# Patient Record
Sex: MALE | Race: WHITE | URBAN - METROPOLITAN AREA
[De-identification: names, ages, dates, MRNs, and addresses within clinical notes are randomized per-mention and may not be internally consistent; named-entity substitution may affect disease eponyms.]

---

## 2021-05-12 ENCOUNTER — APPOINTMENT (OUTPATIENT)
Dept: CT IMAGING | Age: 46
End: 2021-05-12

## 2021-05-12 ENCOUNTER — ANESTHESIA (OUTPATIENT)
Dept: OPERATING ROOM | Age: 46
End: 2021-05-12

## 2021-05-12 ENCOUNTER — APPOINTMENT (OUTPATIENT)
Dept: ULTRASOUND IMAGING | Age: 46
End: 2021-05-12

## 2021-05-12 ENCOUNTER — ANESTHESIA EVENT (OUTPATIENT)
Dept: OPERATING ROOM | Age: 46
End: 2021-05-12

## 2021-05-12 ENCOUNTER — APPOINTMENT (OUTPATIENT)
Dept: GENERAL RADIOLOGY | Age: 46
End: 2021-05-12

## 2021-05-12 ENCOUNTER — HOSPITAL ENCOUNTER (OUTPATIENT)
Age: 46
Setting detail: OBSERVATION
Discharge: HOME | End: 2021-05-15
Attending: EMERGENCY MEDICINE | Admitting: SURGERY

## 2021-05-12 VITALS
DIASTOLIC BLOOD PRESSURE: 87 MMHG | SYSTOLIC BLOOD PRESSURE: 136 MMHG | TEMPERATURE: 98.4 F | RESPIRATION RATE: 7 BRPM | OXYGEN SATURATION: 95 %

## 2021-05-12 DIAGNOSIS — N32.89 INTRAPERITONEAL RUPTURE OF BLADDER: ICD-10-CM

## 2021-05-12 DIAGNOSIS — S01.01XA LACERATION OF SCALP, INITIAL ENCOUNTER: ICD-10-CM

## 2021-05-12 DIAGNOSIS — R10.84 GENERALIZED ABDOMINAL PAIN: Primary | ICD-10-CM

## 2021-05-12 PROBLEM — N17.9 AKI (ACUTE KIDNEY INJURY) (HCC): Status: ACTIVE | Noted: 2021-05-12

## 2021-05-12 PROBLEM — R31.9 URINARY TRACT INFECTION WITH HEMATURIA: Status: ACTIVE | Noted: 2021-05-12

## 2021-05-12 PROBLEM — R18.8 ASCITES: Status: ACTIVE | Noted: 2021-05-12

## 2021-05-12 PROBLEM — R31.9 HEMATURIA: Status: ACTIVE | Noted: 2021-05-12

## 2021-05-12 PROBLEM — R10.9 ABDOMINAL PAIN: Status: ACTIVE | Noted: 2021-05-12

## 2021-05-12 PROBLEM — R18.8 FREE FLUID IN PELVIS: Status: ACTIVE | Noted: 2021-05-12

## 2021-05-12 PROBLEM — N39.0 URINARY TRACT INFECTION WITH HEMATURIA: Status: ACTIVE | Noted: 2021-05-12

## 2021-05-12 LAB
ACETAMINOPHEN LEVEL: <5 MCG/ML (ref 10–30)
ADENOVIRUS BY PCR: NOT DETECTED
ALBUMIN SERPL-MCNC: 4.5 G/DL (ref 3.5–5.2)
ALP BLD-CCNC: 95 U/L (ref 40–129)
ALT SERPL-CCNC: 35 U/L (ref 0–40)
AMPHETAMINE SCREEN, URINE: NOT DETECTED
ANION GAP SERPL CALCULATED.3IONS-SCNC: 15 MMOL/L (ref 7–16)
AST SERPL-CCNC: 75 U/L (ref 0–39)
BACTERIA: ABNORMAL /HPF
BARBITURATE SCREEN URINE: NOT DETECTED
BASOPHILS ABSOLUTE: 0.01 E9/L (ref 0–0.2)
BASOPHILS RELATIVE PERCENT: 0.1 % (ref 0–2)
BENZODIAZEPINE SCREEN, URINE: NOT DETECTED
BILIRUB SERPL-MCNC: 0.3 MG/DL (ref 0–1.2)
BILIRUBIN URINE: ABNORMAL
BLOOD, URINE: ABNORMAL
BORDETELLA PARAPERTUSSIS BY PCR: NOT DETECTED
BORDETELLA PERTUSSIS BY PCR: NOT DETECTED
BUN BLDV-MCNC: 25 MG/DL (ref 6–20)
CALCIUM SERPL-MCNC: 8.8 MG/DL (ref 8.6–10.2)
CANNABINOID SCREEN URINE: NOT DETECTED
CHLAMYDOPHILIA PNEUMONIAE BY PCR: NOT DETECTED
CHLORIDE BLD-SCNC: 103 MMOL/L (ref 98–107)
CLARITY: ABNORMAL
CO2: 18 MMOL/L (ref 22–29)
COCAINE METABOLITE SCREEN URINE: NOT DETECTED
COLOR: ABNORMAL
CORONAVIRUS 229E BY PCR: NOT DETECTED
CORONAVIRUS HKU1 BY PCR: NOT DETECTED
CORONAVIRUS NL63 BY PCR: NOT DETECTED
CORONAVIRUS OC43 BY PCR: NOT DETECTED
CREAT SERPL-MCNC: 1.9 MG/DL (ref 0.7–1.2)
EKG ATRIAL RATE: 89 BPM
EKG P AXIS: 79 DEGREES
EKG P-R INTERVAL: 136 MS
EKG Q-T INTERVAL: 356 MS
EKG QRS DURATION: 84 MS
EKG QTC CALCULATION (BAZETT): 433 MS
EKG R AXIS: 70 DEGREES
EKG T AXIS: 43 DEGREES
EKG VENTRICULAR RATE: 89 BPM
EOSINOPHILS ABSOLUTE: 0 E9/L (ref 0.05–0.5)
EOSINOPHILS RELATIVE PERCENT: 0 % (ref 0–6)
EPITHELIAL CELLS, UA: ABNORMAL /HPF
ETHANOL: 77 MG/DL (ref 0–0.08)
FENTANYL SCREEN, URINE: NOT DETECTED
GFR AFRICAN AMERICAN: 47
GFR NON-AFRICAN AMERICAN: 39 ML/MIN/1.73
GLUCOSE BLD-MCNC: 117 MG/DL (ref 74–99)
GLUCOSE URINE: NEGATIVE MG/DL
HCT VFR BLD CALC: 40.1 % (ref 37–54)
HEMOGLOBIN: 13.6 G/DL (ref 12.5–16.5)
HUMAN METAPNEUMOVIRUS BY PCR: NOT DETECTED
HUMAN RHINOVIRUS/ENTEROVIRUS BY PCR: NOT DETECTED
IMMATURE GRANULOCYTES #: 0.03 E9/L
IMMATURE GRANULOCYTES %: 0.4 % (ref 0–5)
INFLUENZA A BY PCR: NOT DETECTED
INFLUENZA B BY PCR: NOT DETECTED
INR BLD: 1
KETONES, URINE: 15 MG/DL
LACTIC ACID: 2 MMOL/L (ref 0.5–2.2)
LACTIC ACID: 2.4 MMOL/L (ref 0.5–2.2)
LEUKOCYTE ESTERASE, URINE: ABNORMAL
LIPASE: 24 U/L (ref 13–60)
LYMPHOCYTES ABSOLUTE: 0.75 E9/L (ref 1.5–4)
LYMPHOCYTES RELATIVE PERCENT: 9.2 % (ref 20–42)
Lab: NORMAL
MCH RBC QN AUTO: 28.6 PG (ref 26–35)
MCHC RBC AUTO-ENTMCNC: 33.9 % (ref 32–34.5)
MCV RBC AUTO: 84.2 FL (ref 80–99.9)
METHADONE SCREEN, URINE: NOT DETECTED
MONOCYTES ABSOLUTE: 0.3 E9/L (ref 0.1–0.95)
MONOCYTES RELATIVE PERCENT: 3.7 % (ref 2–12)
MYCOPLASMA PNEUMONIAE BY PCR: NOT DETECTED
NEUTROPHILS ABSOLUTE: 7.05 E9/L (ref 1.8–7.3)
NEUTROPHILS RELATIVE PERCENT: 86.6 % (ref 43–80)
NITRITE, URINE: POSITIVE
OPIATE SCREEN URINE: NOT DETECTED
OXYCODONE URINE: NOT DETECTED
PARAINFLUENZA VIRUS 1 BY PCR: NOT DETECTED
PARAINFLUENZA VIRUS 2 BY PCR: NOT DETECTED
PARAINFLUENZA VIRUS 3 BY PCR: NOT DETECTED
PARAINFLUENZA VIRUS 4 BY PCR: NOT DETECTED
PDW BLD-RTO: 13.7 FL (ref 11.5–15)
PH UA: 7.5 (ref 5–9)
PHENCYCLIDINE SCREEN URINE: NOT DETECTED
PLATELET # BLD: 327 E9/L (ref 130–450)
PMV BLD AUTO: 8.8 FL (ref 7–12)
POTASSIUM SERPL-SCNC: 4.1 MMOL/L (ref 3.5–5)
PROCALCITONIN: 0.55 NG/ML (ref 0–0.08)
PROTEIN UA: >=300 MG/DL
PROTHROMBIN TIME: 11.8 SEC (ref 9.3–12.4)
RBC # BLD: 4.76 E12/L (ref 3.8–5.8)
RBC UA: >20 /HPF (ref 0–2)
RESPIRATORY SYNCYTIAL VIRUS BY PCR: NOT DETECTED
SALICYLATE, SERUM: <0.3 MG/DL (ref 0–30)
SARS-COV-2, NAAT: NOT DETECTED
SARS-COV-2, PCR: NOT DETECTED
SEDIMENTATION RATE, ERYTHROCYTE: 15 MM/HR (ref 0–15)
SODIUM BLD-SCNC: 136 MMOL/L (ref 132–146)
SPECIFIC GRAVITY UA: 1.02 (ref 1–1.03)
TOTAL PROTEIN: 7.4 G/DL (ref 6.4–8.3)
TRICYCLIC ANTIDEPRESSANTS SCREEN SERUM: NEGATIVE NG/ML
UROBILINOGEN, URINE: 2 E.U./DL
WBC # BLD: 8.1 E9/L (ref 4.5–11.5)
WBC UA: ABNORMAL /HPF (ref 0–5)

## 2021-05-12 PROCEDURE — 73110 X-RAY EXAM OF WRIST: CPT

## 2021-05-12 PROCEDURE — 82077 ASSAY SPEC XCP UR&BREATH IA: CPT

## 2021-05-12 PROCEDURE — 49320 DIAG LAPARO SEPARATE PROC: CPT | Performed by: SURGERY

## 2021-05-12 PROCEDURE — 6360000002 HC RX W HCPCS

## 2021-05-12 PROCEDURE — 80143 DRUG ASSAY ACETAMINOPHEN: CPT

## 2021-05-12 PROCEDURE — 51702 INSERT TEMP BLADDER CATH: CPT

## 2021-05-12 PROCEDURE — 85025 COMPLETE CBC W/AUTO DIFF WBC: CPT

## 2021-05-12 PROCEDURE — 99243 OFF/OP CNSLTJ NEW/EST LOW 30: CPT | Performed by: INTERNAL MEDICINE

## 2021-05-12 PROCEDURE — 87088 URINE BACTERIA CULTURE: CPT

## 2021-05-12 PROCEDURE — 93005 ELECTROCARDIOGRAM TRACING: CPT | Performed by: EMERGENCY MEDICINE

## 2021-05-12 PROCEDURE — 6360000002 HC RX W HCPCS: Performed by: SURGERY

## 2021-05-12 PROCEDURE — G0378 HOSPITAL OBSERVATION PER HR: HCPCS

## 2021-05-12 PROCEDURE — 7100000000 HC PACU RECOVERY - FIRST 15 MIN: Performed by: UROLOGY

## 2021-05-12 PROCEDURE — 3700000001 HC ADD 15 MINUTES (ANESTHESIA): Performed by: UROLOGY

## 2021-05-12 PROCEDURE — 51798 US URINE CAPACITY MEASURE: CPT

## 2021-05-12 PROCEDURE — 2500000003 HC RX 250 WO HCPCS: Performed by: UROLOGY

## 2021-05-12 PROCEDURE — 3600000019 HC SURGERY ROBOT ADDTL 15MIN: Performed by: UROLOGY

## 2021-05-12 PROCEDURE — 80053 COMPREHEN METABOLIC PANEL: CPT

## 2021-05-12 PROCEDURE — 87635 SARS-COV-2 COVID-19 AMP PRB: CPT

## 2021-05-12 PROCEDURE — 83690 ASSAY OF LIPASE: CPT

## 2021-05-12 PROCEDURE — 7100000001 HC PACU RECOVERY - ADDTL 15 MIN: Performed by: UROLOGY

## 2021-05-12 PROCEDURE — 6360000004 HC RX CONTRAST MEDICATION: Performed by: RADIOLOGY

## 2021-05-12 PROCEDURE — 80307 DRUG TEST PRSMV CHEM ANLYZR: CPT

## 2021-05-12 PROCEDURE — 2580000003 HC RX 258: Performed by: EMERGENCY MEDICINE

## 2021-05-12 PROCEDURE — 6360000002 HC RX W HCPCS: Performed by: NURSE ANESTHETIST, CERTIFIED REGISTERED

## 2021-05-12 PROCEDURE — 2709999900 HC NON-CHARGEABLE SUPPLY: Performed by: UROLOGY

## 2021-05-12 PROCEDURE — 80179 DRUG ASSAY SALICYLATE: CPT

## 2021-05-12 PROCEDURE — 96361 HYDRATE IV INFUSION ADD-ON: CPT

## 2021-05-12 PROCEDURE — 36415 COLL VENOUS BLD VENIPUNCTURE: CPT

## 2021-05-12 PROCEDURE — 84145 PROCALCITONIN (PCT): CPT

## 2021-05-12 PROCEDURE — 99219 PR INITIAL OBSERVATION CARE/DAY 50 MINUTES: CPT | Performed by: SURGERY

## 2021-05-12 PROCEDURE — 2500000003 HC RX 250 WO HCPCS: Performed by: NURSE ANESTHETIST, CERTIFIED REGISTERED

## 2021-05-12 PROCEDURE — 72125 CT NECK SPINE W/O DYE: CPT

## 2021-05-12 PROCEDURE — S2900 ROBOTIC SURGICAL SYSTEM: HCPCS | Performed by: UROLOGY

## 2021-05-12 PROCEDURE — 87040 BLOOD CULTURE FOR BACTERIA: CPT

## 2021-05-12 PROCEDURE — 99284 EMERGENCY DEPT VISIT MOD MDM: CPT

## 2021-05-12 PROCEDURE — 2580000003 HC RX 258: Performed by: SURGERY

## 2021-05-12 PROCEDURE — 96360 HYDRATION IV INFUSION INIT: CPT

## 2021-05-12 PROCEDURE — 87491 CHLMYD TRACH DNA AMP PROBE: CPT

## 2021-05-12 PROCEDURE — 87591 N.GONORRHOEAE DNA AMP PROB: CPT

## 2021-05-12 PROCEDURE — 0202U NFCT DS 22 TRGT SARS-COV-2: CPT

## 2021-05-12 PROCEDURE — 88112 CYTOPATH CELL ENHANCE TECH: CPT

## 2021-05-12 PROCEDURE — 81001 URINALYSIS AUTO W/SCOPE: CPT

## 2021-05-12 PROCEDURE — C9113 INJ PANTOPRAZOLE SODIUM, VIA: HCPCS | Performed by: SURGERY

## 2021-05-12 PROCEDURE — 83605 ASSAY OF LACTIC ACID: CPT

## 2021-05-12 PROCEDURE — 3700000000 HC ANESTHESIA ATTENDED CARE: Performed by: UROLOGY

## 2021-05-12 PROCEDURE — 6360000002 HC RX W HCPCS: Performed by: CLINICAL NURSE SPECIALIST

## 2021-05-12 PROCEDURE — 74176 CT ABD & PELVIS W/O CONTRAST: CPT

## 2021-05-12 PROCEDURE — 2500000003 HC RX 250 WO HCPCS: Performed by: SURGERY

## 2021-05-12 PROCEDURE — C1729 CATH, DRAINAGE: HCPCS | Performed by: UROLOGY

## 2021-05-12 PROCEDURE — 85610 PROTHROMBIN TIME: CPT

## 2021-05-12 PROCEDURE — 85651 RBC SED RATE NONAUTOMATED: CPT

## 2021-05-12 PROCEDURE — 72193 CT PELVIS W/DYE: CPT

## 2021-05-12 PROCEDURE — 76705 ECHO EXAM OF ABDOMEN: CPT

## 2021-05-12 PROCEDURE — 70450 CT HEAD/BRAIN W/O DYE: CPT

## 2021-05-12 PROCEDURE — 3600000009 HC SURGERY ROBOT BASE: Performed by: UROLOGY

## 2021-05-12 RX ORDER — NEOSTIGMINE METHYLSULFATE 1 MG/ML
INJECTION, SOLUTION INTRAVENOUS PRN
Status: DISCONTINUED | OUTPATIENT
Start: 2021-05-12 | End: 2021-05-12 | Stop reason: SDUPTHER

## 2021-05-12 RX ORDER — ONDANSETRON 2 MG/ML
4 INJECTION INTRAMUSCULAR; INTRAVENOUS EVERY 6 HOURS PRN
Status: DISCONTINUED | OUTPATIENT
Start: 2021-05-12 | End: 2021-05-15 | Stop reason: HOSPADM

## 2021-05-12 RX ORDER — PANTOPRAZOLE SODIUM 40 MG/10ML
40 INJECTION, POWDER, LYOPHILIZED, FOR SOLUTION INTRAVENOUS DAILY
Status: DISCONTINUED | OUTPATIENT
Start: 2021-05-12 | End: 2021-05-15 | Stop reason: HOSPADM

## 2021-05-12 RX ORDER — MORPHINE SULFATE 2 MG/ML
2 INJECTION, SOLUTION INTRAMUSCULAR; INTRAVENOUS EVERY 4 HOURS PRN
Status: DISCONTINUED | OUTPATIENT
Start: 2021-05-12 | End: 2021-05-14

## 2021-05-12 RX ORDER — CEFTRIAXONE 2 G/50ML
2000 INJECTION, SOLUTION INTRAVENOUS EVERY 24 HOURS
Status: DISCONTINUED | OUTPATIENT
Start: 2021-05-12 | End: 2021-05-14

## 2021-05-12 RX ORDER — SODIUM CHLORIDE 9 MG/ML
25 INJECTION, SOLUTION INTRAVENOUS PRN
Status: DISCONTINUED | OUTPATIENT
Start: 2021-05-12 | End: 2021-05-15 | Stop reason: HOSPADM

## 2021-05-12 RX ORDER — ROCURONIUM BROMIDE 10 MG/ML
INJECTION, SOLUTION INTRAVENOUS PRN
Status: DISCONTINUED | OUTPATIENT
Start: 2021-05-12 | End: 2021-05-12 | Stop reason: SDUPTHER

## 2021-05-12 RX ORDER — ONDANSETRON 4 MG/1
4 TABLET, ORALLY DISINTEGRATING ORAL EVERY 8 HOURS PRN
Status: DISCONTINUED | OUTPATIENT
Start: 2021-05-12 | End: 2021-05-15 | Stop reason: HOSPADM

## 2021-05-12 RX ORDER — MORPHINE SULFATE 4 MG/ML
4 INJECTION, SOLUTION INTRAMUSCULAR; INTRAVENOUS EVERY 4 HOURS PRN
Status: DISCONTINUED | OUTPATIENT
Start: 2021-05-12 | End: 2021-05-14

## 2021-05-12 RX ORDER — DEXAMETHASONE SODIUM PHOSPHATE 10 MG/ML
INJECTION, SOLUTION INTRAMUSCULAR; INTRAVENOUS PRN
Status: DISCONTINUED | OUTPATIENT
Start: 2021-05-12 | End: 2021-05-12 | Stop reason: SDUPTHER

## 2021-05-12 RX ORDER — OXYCODONE HYDROCHLORIDE AND ACETAMINOPHEN 5; 325 MG/1; MG/1
1 TABLET ORAL EVERY 6 HOURS PRN
Status: DISCONTINUED | OUTPATIENT
Start: 2021-05-12 | End: 2021-05-15 | Stop reason: HOSPADM

## 2021-05-12 RX ORDER — SUCCINYLCHOLINE/SOD CL,ISO/PF 200MG/10ML
SYRINGE (ML) INTRAVENOUS PRN
Status: DISCONTINUED | OUTPATIENT
Start: 2021-05-12 | End: 2021-05-12 | Stop reason: SDUPTHER

## 2021-05-12 RX ORDER — ONDANSETRON 2 MG/ML
INJECTION INTRAMUSCULAR; INTRAVENOUS PRN
Status: DISCONTINUED | OUTPATIENT
Start: 2021-05-12 | End: 2021-05-12 | Stop reason: SDUPTHER

## 2021-05-12 RX ORDER — 0.9 % SODIUM CHLORIDE 0.9 %
1000 INTRAVENOUS SOLUTION INTRAVENOUS ONCE
Status: COMPLETED | OUTPATIENT
Start: 2021-05-12 | End: 2021-05-12

## 2021-05-12 RX ORDER — BUPIVACAINE HYDROCHLORIDE 2.5 MG/ML
INJECTION, SOLUTION EPIDURAL; INFILTRATION; INTRACAUDAL PRN
Status: DISCONTINUED | OUTPATIENT
Start: 2021-05-12 | End: 2021-05-12 | Stop reason: ALTCHOICE

## 2021-05-12 RX ORDER — ATROPA BELLADONNA AND OPIUM 16.2; 6 MG/1; MG/1
60 SUPPOSITORY RECTAL EVERY 8 HOURS PRN
Status: DISCONTINUED | OUTPATIENT
Start: 2021-05-12 | End: 2021-05-15 | Stop reason: HOSPADM

## 2021-05-12 RX ORDER — GLYCOPYRROLATE 1 MG/5 ML
SYRINGE (ML) INTRAVENOUS PRN
Status: DISCONTINUED | OUTPATIENT
Start: 2021-05-12 | End: 2021-05-12 | Stop reason: SDUPTHER

## 2021-05-12 RX ORDER — PROPOFOL 10 MG/ML
INJECTION, EMULSION INTRAVENOUS PRN
Status: DISCONTINUED | OUTPATIENT
Start: 2021-05-12 | End: 2021-05-12 | Stop reason: SDUPTHER

## 2021-05-12 RX ORDER — FENTANYL CITRATE 50 UG/ML
25 INJECTION, SOLUTION INTRAMUSCULAR; INTRAVENOUS EVERY 5 MIN PRN
Status: DISCONTINUED | OUTPATIENT
Start: 2021-05-12 | End: 2021-05-12 | Stop reason: HOSPADM

## 2021-05-12 RX ORDER — SODIUM CHLORIDE, SODIUM LACTATE, POTASSIUM CHLORIDE, CALCIUM CHLORIDE 600; 310; 30; 20 MG/100ML; MG/100ML; MG/100ML; MG/100ML
INJECTION, SOLUTION INTRAVENOUS CONTINUOUS
Status: DISCONTINUED | OUTPATIENT
Start: 2021-05-12 | End: 2021-05-15 | Stop reason: HOSPADM

## 2021-05-12 RX ORDER — HEPARIN SODIUM 5000 [USP'U]/ML
5000 INJECTION, SOLUTION INTRAVENOUS; SUBCUTANEOUS EVERY 8 HOURS SCHEDULED
Status: DISCONTINUED | OUTPATIENT
Start: 2021-05-12 | End: 2021-05-15 | Stop reason: HOSPADM

## 2021-05-12 RX ORDER — FENTANYL CITRATE 50 UG/ML
INJECTION, SOLUTION INTRAMUSCULAR; INTRAVENOUS PRN
Status: DISCONTINUED | OUTPATIENT
Start: 2021-05-12 | End: 2021-05-12 | Stop reason: SDUPTHER

## 2021-05-12 RX ORDER — LIDOCAINE HYDROCHLORIDE 20 MG/ML
INJECTION, SOLUTION INTRAVENOUS PRN
Status: DISCONTINUED | OUTPATIENT
Start: 2021-05-12 | End: 2021-05-12 | Stop reason: SDUPTHER

## 2021-05-12 RX ORDER — SODIUM CHLORIDE 0.9 % (FLUSH) 0.9 %
10 SYRINGE (ML) INJECTION PRN
Status: DISCONTINUED | OUTPATIENT
Start: 2021-05-12 | End: 2021-05-15 | Stop reason: HOSPADM

## 2021-05-12 RX ORDER — SODIUM CHLORIDE 9 MG/ML
INJECTION, SOLUTION INTRAVENOUS CONTINUOUS
Status: DISCONTINUED | OUTPATIENT
Start: 2021-05-12 | End: 2021-05-12

## 2021-05-12 RX ORDER — SODIUM CHLORIDE 0.9 % (FLUSH) 0.9 %
10 SYRINGE (ML) INJECTION EVERY 12 HOURS SCHEDULED
Status: DISCONTINUED | OUTPATIENT
Start: 2021-05-12 | End: 2021-05-15 | Stop reason: HOSPADM

## 2021-05-12 RX ORDER — SODIUM CHLORIDE 9 MG/ML
10 INJECTION INTRAVENOUS DAILY
Status: DISCONTINUED | OUTPATIENT
Start: 2021-05-12 | End: 2021-05-15 | Stop reason: HOSPADM

## 2021-05-12 RX ADMIN — ROCURONIUM BROMIDE 5 MG: 10 SOLUTION INTRAVENOUS at 21:41

## 2021-05-12 RX ADMIN — MORPHINE SULFATE 4 MG: 4 INJECTION, SOLUTION INTRAMUSCULAR; INTRAVENOUS at 23:59

## 2021-05-12 RX ADMIN — METRONIDAZOLE 500 MG: 500 INJECTION, SOLUTION INTRAVENOUS at 19:40

## 2021-05-12 RX ADMIN — PROPOFOL 200 MG: 10 INJECTION, EMULSION INTRAVENOUS at 21:41

## 2021-05-12 RX ADMIN — HEPARIN SODIUM 5000 UNITS: 5000 INJECTION, SOLUTION INTRAVENOUS; SUBCUTANEOUS at 19:45

## 2021-05-12 RX ADMIN — SODIUM CHLORIDE 1000 ML: 9 INJECTION, SOLUTION INTRAVENOUS at 05:20

## 2021-05-12 RX ADMIN — MORPHINE SULFATE 4 MG: 4 INJECTION, SOLUTION INTRAMUSCULAR; INTRAVENOUS at 17:23

## 2021-05-12 RX ADMIN — SODIUM CHLORIDE, POTASSIUM CHLORIDE, SODIUM LACTATE AND CALCIUM CHLORIDE: 600; 310; 30; 20 INJECTION, SOLUTION INTRAVENOUS at 21:38

## 2021-05-12 RX ADMIN — ROCURONIUM BROMIDE 35 MG: 10 SOLUTION INTRAVENOUS at 21:48

## 2021-05-12 RX ADMIN — LIDOCAINE HYDROCHLORIDE 100 MG: 20 INJECTION, SOLUTION INTRAVENOUS at 21:41

## 2021-05-12 RX ADMIN — METRONIDAZOLE 500 MG: 500 INJECTION, SOLUTION INTRAVENOUS at 11:46

## 2021-05-12 RX ADMIN — SODIUM CHLORIDE, POTASSIUM CHLORIDE, SODIUM LACTATE AND CALCIUM CHLORIDE: 600; 310; 30; 20 INJECTION, SOLUTION INTRAVENOUS at 11:45

## 2021-05-12 RX ADMIN — FENTANYL CITRATE 150 MCG: 50 INJECTION, SOLUTION INTRAMUSCULAR; INTRAVENOUS at 21:41

## 2021-05-12 RX ADMIN — ONDANSETRON 4 MG: 2 INJECTION INTRAMUSCULAR; INTRAVENOUS at 22:28

## 2021-05-12 RX ADMIN — HYDROMORPHONE HYDROCHLORIDE 0.5 MG: 1 INJECTION, SOLUTION INTRAMUSCULAR; INTRAVENOUS; SUBCUTANEOUS at 23:06

## 2021-05-12 RX ADMIN — IOPAMIDOL 40 ML: 755 INJECTION, SOLUTION INTRAVENOUS at 18:05

## 2021-05-12 RX ADMIN — SODIUM CHLORIDE, POTASSIUM CHLORIDE, SODIUM LACTATE AND CALCIUM CHLORIDE: 600; 310; 30; 20 INJECTION, SOLUTION INTRAVENOUS at 21:52

## 2021-05-12 RX ADMIN — MORPHINE SULFATE 4 MG: 4 INJECTION, SOLUTION INTRAMUSCULAR; INTRAVENOUS at 11:56

## 2021-05-12 RX ADMIN — DEXAMETHASONE SODIUM PHOSPHATE 10 MG: 10 INJECTION, SOLUTION INTRAMUSCULAR; INTRAVENOUS at 21:41

## 2021-05-12 RX ADMIN — CEFTRIAXONE 2000 MG: 2 INJECTION, SOLUTION INTRAVENOUS at 13:53

## 2021-05-12 RX ADMIN — PANTOPRAZOLE SODIUM 40 MG: 40 INJECTION, POWDER, FOR SOLUTION INTRAVENOUS at 11:46

## 2021-05-12 RX ADMIN — Medication 3 MG: at 22:28

## 2021-05-12 RX ADMIN — Medication 0.6 MG: at 22:28

## 2021-05-12 RX ADMIN — Medication 140 MG: at 21:41

## 2021-05-12 RX ADMIN — SODIUM CHLORIDE 1000 ML: 9 INJECTION, SOLUTION INTRAVENOUS at 04:41

## 2021-05-12 RX ADMIN — SODIUM CHLORIDE, POTASSIUM CHLORIDE, SODIUM LACTATE AND CALCIUM CHLORIDE: 600; 310; 30; 20 INJECTION, SOLUTION INTRAVENOUS at 22:28

## 2021-05-12 RX ADMIN — Medication 0.5 MG: at 23:06

## 2021-05-12 RX ADMIN — SODIUM CHLORIDE, PRESERVATIVE FREE 10 ML: 5 INJECTION INTRAVENOUS at 11:46

## 2021-05-12 ASSESSMENT — PULMONARY FUNCTION TESTS
PIF_VALUE: 0
PIF_VALUE: 27
PIF_VALUE: 25
PIF_VALUE: 28
PIF_VALUE: 29
PIF_VALUE: 17
PIF_VALUE: 27
PIF_VALUE: 0
PIF_VALUE: 27
PIF_VALUE: 12
PIF_VALUE: 29
PIF_VALUE: 2
PIF_VALUE: 30
PIF_VALUE: 30
PIF_VALUE: 2
PIF_VALUE: 2
PIF_VALUE: 1
PIF_VALUE: 29
PIF_VALUE: 4
PIF_VALUE: 29

## 2021-05-12 ASSESSMENT — PAIN SCALES - GENERAL
PAINLEVEL_OUTOF10: 0
PAINLEVEL_OUTOF10: 2
PAINLEVEL_OUTOF10: 9

## 2021-05-12 ASSESSMENT — PAIN DESCRIPTION - DESCRIPTORS: DESCRIPTORS: PATIENT UNABLE TO DESCRIBE

## 2021-05-12 ASSESSMENT — LIFESTYLE VARIABLES: SMOKING_STATUS: 1

## 2021-05-12 NOTE — ED PROVIDER NOTES
Patient presents via EMS. 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 He is awake, alert but does not say anything other than \"infection\". A co-worker was reached via phone by RN. He states the patient fell and struck his head. They are unsure if alcohol was involved or the surrounding circumstances. He was not able to provide further history. The history is provided by a friend. Review of Systems   Unable to perform ROS: Other       Physical Exam  Vitals signs and nursing note reviewed. Constitutional:       General: He is not in acute distress. Appearance: Normal appearance. He is well-developed and normal weight. He is not ill-appearing or toxic-appearing. HENT:      Head: Normocephalic. Comments: Abrasion overlying hematoma to the posterior scalp       Nose: Nose normal.      Mouth/Throat:      Mouth: Mucous membranes are dry. Pharynx: Oropharynx is clear. Eyes:      Extraocular Movements: Extraocular movements intact. Conjunctiva/sclera: Conjunctivae normal.      Pupils: Pupils are equal, round, and reactive to light. Neck:      Musculoskeletal: Normal range of motion and neck supple. No neck rigidity or muscular tenderness. Cardiovascular:      Rate and Rhythm: Regular rhythm. Tachycardia present. Pulses: Normal pulses. Heart sounds: Normal heart sounds. No murmur. Pulmonary:      Effort: Pulmonary effort is normal. No respiratory distress. Breath sounds: Normal breath sounds. No wheezing or rales. Abdominal:      General: Bowel sounds are normal.      Palpations: Abdomen is soft. Tenderness: There is abdominal tenderness (diffuse). There is guarding. There is no right CVA tenderness, left CVA tenderness or rebound. Genitourinary:     Penis: Normal.       Testes: Normal.   Musculoskeletal: Normal range of motion. Right lower leg: No edema. Left lower leg: No edema. Skin:     General: Skin is warm and dry. Capillary Refill: Capillary refill takes less than 2 seconds. Coloration: Skin is not pale. Neurological:      General: No focal deficit present. Mental Status: He is alert. Mental status is at baseline. Motor: No weakness. Coordination: Coordination normal.         Procedures    MDM    ED Course as of May 12 0637   Wed May 12, 2021   0559 Re-examination of abdomen. He continues to have guarding and diffuse pain. CT read is concerning for free fluid. Call placed to surgery. [JS]      ED Course User Index  [JS] Brooke Lopez DO   Attempts are being made to use  iPad. Patient does not understand the . EKG Interpretation    Interpreted by emergency department physician    Rhythm: normal sinus   Rate: normal  Axis: normal  Ectopy: none  Conduction: normal  ST Segments: normal  T Waves: normal  Q Waves: none    Clinical Impression: no acute changes    Brooke Lopez  ED Course as of May 12 0637   Wed May 12, 2021   0559 Re-examination of abdomen. He continues to have guarding and diffuse pain. CT read is concerning for free fluid. Call placed to surgery. [JS]      ED Course User Index  [JS] Brooke Lopez DO       --------------------------------------------- PAST HISTORY ---------------------------------------------  Past Medical History:  has no past medical history on file. Past Surgical History:  has no past surgical history on file. Social History:      Family History: family history is not on file. The patients home medications have been reviewed.     Allergies: Patient has no allergy information on record.    -------------------------------------------------- RESULTS -------------------------------------------------    Lab  Results for orders placed or performed during the hospital encounter of 05/12/21   C.trachomatis N.gonorrhoeae DNA, Urine    Specimen: Urine   Result Value Ref Range    Source Urine abnormality. CT CERVICAL SPINE WO CONTRAST   Final Result   No acute abnormality of the cervical spine. MRI would be useful if symptoms   persist.         CT ABDOMEN PELVIS WO CONTRAST Additional Contrast? None   Final Result   Moderate amount of low-attenuation free fluid throughout the abdomen and   pelvis of uncertain etiology. Patchy areas of atelectasis versus developing infiltrates involving the right   greater than left lung base. Mild urinary bladder wall thickening. This may be related to   underdistention. Cystitis not excluded. Unusual curvilinear densities immediately superior to the urinary bladder. While these could represent volume averaging with small bowel loops, they are   difficult to definitively connect to bowel and therefore other etiologies   including ill-defined hemorrhage or hematoma not entirely excluded and should   be correlated with the patient's overall clinical presentation. Short-term   follow-up imaging may be useful if indicated. EKG:  This EKG is signed and interpreted by me.    ------------------------- NURSING NOTES AND VITALS REVIEWED ---------------------------  Date / Time Roomed:  5/12/2021  3:27 AM  ED Bed Assignment:  01/01    The nursing notes within the ED encounter and vital signs as below have been reviewed. Patient Vitals for the past 24 hrs:   BP Temp Temp src Pulse Resp SpO2 Height Weight   05/12/21 0612 (!) 143/79   91 18 95 %     05/12/21 0529 121/74   87 18 97 %     05/12/21 0424 110/62 98.2 °F (36.8 °C) Oral 88 18 99 % 5' 6\" (1.676 m) 150 lb (68 kg)   05/12/21 0311 122/66 98.3 °F (36.8 °C) Oral 100 18 99 %         Oxygen Saturation Interpretation: Normal            --------------------------------- ADDITIONAL PROVIDER NOTES ---------------------------------  Consultations:  Spoke with Dr. Curtis Isaac,  They will admit this patient.     This patient's ED course included: a personal history and physicial examination, multiple bedside re-evaluations and IV medications    This patient has remained hemodynamically stable during their ED course. Please note that the withdrawal or failure to initiate urgent interventions for this patient would likely result in a life threatening deterioration or permanent disability. Accordingly this patient received 0 minutes of critical care time, excluding separately billable procedures. Clinical Impression  1. Generalized abdominal pain    2. Laceration of scalp, initial encounter          Disposition  Patient's disposition: Admit to med/surg floor  Patient's condition is stable.        [address], DO  05/12/21 0700

## 2021-05-12 NOTE — H&P
General Surgery History and Physical  [facility]    Patient's Name/Date of Birth: Rosales Petty / 1/1/1887    Date: May 12, 2021     Surgeon: Kandi Graham M.D.    PCP: No primary care provider on file. Chief Complaint: Abdominal pain, fall    HPI:   Rosales Petty is a 81 y.o. male who presents for evaluation of abdominal pain, for. Timing is constant, radiation to middle abdomen lower abdomen, alleviated by none and started few days ago and severity is 9/10. Limited communication secondary to the patient's nationality and language he speaks. Patient's undocumented immigrant from [place] and are translation line does not provide services in his dialect. Attempted Faroese however he still seems to only partially comprehend our discussion. States he has had some lower abdominal pain has been worsening over the last 2 days. He admits some fevers denies any diarrhea. He has been unable to void over the last 24 hours. Denies any shortness of breath or chest pain. He does drink alcohol but he states this is not a daily occurrence but at this point our conversation becomes very ineffective. He denies any sick contacts. Denies any previous abdominal surgeries. Report from patient's coworker states that he fell and struck his head although there is no signs of trauma. Patient Active Problem List   Diagnosis    Abdominal pain       History reviewed. No pertinent past medical history. History reviewed. No pertinent surgical history. Not on File    The patient has a family history that is negative for severe cardiovascular or respiratory issues, negative for reaction to anesthesia. Time spent reviewing past medical, surgical, social and family history, vitals, nursing assessment and images. No changes from above documented history.     Social History     Socioeconomic History    Marital status: Unknown     Spouse name: Not on file    Number of children: Not on file    Years of education: Not on file    Highest education level: Not on file   Occupational History    Not on file   Social Needs    Financial resource strain: Not on file    Food insecurity     Worry: Not on file     Inability: Not on file    Transportation needs     Medical: Not on file     Non-medical: Not on file   Tobacco Use    Smoking status: Not on file   Substance and Sexual Activity    Alcohol use: Not on file    Drug use: Not on file    Sexual activity: Not on file   Lifestyle    Physical activity     Days per week: Not on file     Minutes per session: Not on file    Stress: Not on file   Relationships    Social connections     Talks on phone: Not on file     Gets together: Not on file     Attends Christianity service: Not on file     Active member of club or organization: Not on file     Attends meetings of clubs or organizations: Not on file     Relationship status: Not on file    Intimate partner violence     Fear of current or ex partner: Not on file     Emotionally abused: Not on file     Physically abused: Not on file     Forced sexual activity: Not on file   Other Topics Concern    Not on file   Social History Narrative    Not on file       I have reviewed relevant labs from this admission and interpretation is included in my assessment and plan    Review of Systems    A complete 10 system review was performed and are otherwise negative unless mentioned in the above HPI. Specific negatives are listed below but may not include all those reviewed.     General ROS: negative obtundation, AMS  ENT ROS: negative rhinorrhea, epistaxis  Allergy and Immunology ROS: negative itchy/watery eyes or nasal congestion  Hematological and Lymphatic ROS: negative spontaneous bleeding or bruising  Endocrine ROS: negative  lethargy, mood swings, palpitations or polydipsia/polyuria  Respiratory ROS: negative sputum changes, stridor, tachypnea or wheezing  Cardiovascular ROS: negative for - loss of consciousness, murmur or orthopnea  Gastrointestinal ROS: negative for - hematochezia or hematemesis  Genito-Urinary ROS: negative for -  genital discharge or hematuria  Musculoskeletal ROS: negative for - focal weakness, gangrene  Psych/Neuro ROS: negative for - visual or auditory hallucinations, suicidal ideation    Physical exam:   /83   Pulse 80   Temp 98.9 °F (37.2 °C) (Oral)   Resp 18   Ht 5' 6\" (1.676 m)   Wt 150 lb (68 kg)   SpO2 97%   BMI 24.21 kg/m²   General appearance:  NAD, appears stated age  Head: NCAT, PERRLA, EOMI, red conjunctiva  Neck: supple, no masses, trachea midline  Lungs: Equal chest rise bilateral, no retractions, no wheezing  Heart: Reg rate  Abdomen: soft, tender lower abdomen, distended  Skin; warm and dry, no cyanosis  Gu: no cva tenderness  Extremities: atraumatic, no focal motor deficits, no open wounds  Psych: No tremor, visual hallucinations      Radiology: I reviewed relevant abdominal imaging from this admission and that available in the EMR including CT abd/pel from admission. My assessment is free fluid in the within the abdomen with no evidence of bowel obstruction no evidence of free air or bowel perforation.   There is no evidence of solid organ fracture, there are some calcifications within the spleen but no obvious liver masses    Assessment:  Elly Grimes is a 81 y.o. male with abdominal pain, fall, limited communication  Patient Active Problem List   Diagnosis    Abdominal pain         Plan:  Admission  IV antibiotics  Consult hospitalist infectious disease  Discussed with infectious disease given the patient's symptoms will attempt aspiration of free fluid within the abdomen  Dave catheter will be placed and urine analysis sent  IV pain medication control  N.p.o. for now  Hydration May repeat CT with IV and oral contrast to better elucidate solid organ status  Currently no indication to proceed to the OR his hemoglobin appears stable      Jaquita Score Meg Recinos MD  11:59 AM  5/12/2021

## 2021-05-12 NOTE — ED NOTES
Bed: H2  Expected date: 5/12/21  Expected time:   Means of arrival: [place] [employer]  Comments:  Fall? ETOH?  Abdominal pain     Sharol Nissen, RN  05/12/21 0141

## 2021-05-12 NOTE — CONSULTS
mL, Intravenous, PRN, Melba Chandra MD    0.9 % sodium chloride infusion, 25 mL, Intravenous, PRN, Melba Chandra MD    metronidazole (FLAGYL) 500 mg in NaCl 100 mL IVPB premix, 500 mg, Intravenous, Q8H, Melba Chandra MD    cefTRIAXone (ROCEPHIN) 1,000 mg in sterile water 10 mL IV syringe, 1,000 mg, Intravenous, Daily, Melba Chandra MD    morphine (PF) injection 2 mg, 2 mg, Intravenous, Q4H PRN **OR** morphine injection 4 mg, 4 mg, Intravenous, Q4H PRN, Melba Chandra MD    pantoprazole (PROTONIX) injection 40 mg, 40 mg, Intravenous, Daily **AND** sodium chloride (PF) 0.9 % injection 10 mL, 10 mL, Intravenous, Daily, Melba Chandra MD    ondansetron (ZOFRAN-ODT) disintegrating tablet 4 mg, 4 mg, Oral, Q8H PRN **OR** ondansetron (ZOFRAN) injection 4 mg, 4 mg, Intravenous, Q6H PRN, Melba Chandra MD    heparin (porcine) injection 5,000 Units, 5,000 Units, Subcutaneous, 3 times per day, Melba Chandra MD, Stopped at 05/12/21 0915  ALLERGIES     Patient has no allergy information on record. There is no immunization history on file for this patient.   PAST MEDICAL HISTORY   Unknown And unable to obtain  SURGICAL HISTORY     Unknown and unable to obtin   FAMILY HISTORY     Unknown and unable to obtain   SOCIAL HISTORY       · From [address]       ED Triage Vitals   BP Temp Temp Source Pulse Resp SpO2 Height Weight   05/12/21 0311 05/12/21 0311 05/12/21 0311 05/12/21 0311 05/12/21 0311 05/12/21 0311 05/12/21 0424 05/12/21 0424   122/66 98.3 °F (36.8 °C) Oral 100 18 99 % 5' 6\" (1.676 m) 150 lb (68 kg)     Vitals:    Vitals:    05/12/21 0424 05/12/21 0529 05/12/21 0612 05/12/21 0737   BP: 110/62 121/74 (!) 143/79 137/83   Pulse: 88 87 91 80   Resp: 18 18 18 18   Temp: 98.2 °F (36.8 °C)   98.9 °F (37.2 °C)   TempSrc: Oral   Oral   SpO2: 99% 97% 95% 97%   Weight: 150 lb (68 kg)      Height: 5' 6\" (1.676 m)        Physical Exam   Constitutional/General: Alert and  In bed- + abdominal pain   Head: NC/AT  Eyes: PERRL, EOMI  Mouth: Normal mucosa, no thrush   Neck: Supple, full ROM,    Pulmonary: Lungs clear to auscultation bilaterally. Not in respiratory distress  Cardiovascular:  Regular rate and rhythm, no murmurs, gallops, or rubs. Abdomen: Soft, + BS.  some distention, ++ abdominal pain . Extremities: Moves all extremities x 4. Warm and well perfused  Pulses:  Distal pulses intact  Skin: Warm and dry without rash  PIV  Dave- hematuria      DIAGNOSTIC RESULTS   RADIOLOGY:   Ct Abdomen Pelvis Wo Contrast Additional Contrast? None    Result Date: 5/12/2021  EXAMINATION: CT OF THE ABDOMEN AND PELVIS WITHOUT CONTRAST 5/12/2021 5:13 am TECHNIQUE: CT of the abdomen and pelvis was performed without the administration of intravenous contrast. Multiplanar reformatted images are provided for review. Dose modulation, iterative reconstruction, and/or weight based adjustment of the mA/kV was utilized to reduce the radiation dose to as low as reasonably achievable. COMPARISON: None. HISTORY: ORDERING SYSTEM PROVIDED HISTORY: abdominal and pelvic pain TECHNOLOGIST PROVIDED HISTORY: Reason for exam:->abdominal and pelvic pain Additional Contrast?->None FINDINGS: Exam is limited without intravenous contrast.  There is some streak artifact in the upper abdomen due to positioning of the patient's arms. Liver is homogeneous. Gallbladder is unremarkable. Spleen is normal in size. Pancreas is unremarkable. No adrenal mass. No hydronephrosis or calcified renal stone. Assessment of bowel is limited without oral contrast.  There is no obvious evidence of bowel obstruction. The appendix is not identified with complete certainty. Moderate amount of free fluid is identified throughout the abdomen and the pelvis. This fluid measures approximately 6 Hounsfield units in the pelvis and between 0 and 3 Hounsfield units in areas in the abdomen. This is therefore thought to not represent hemoperitoneum. No abscess or free air identified. The urinary bladder is incompletely distended with some minimal wall thickening. Just superior to the bladder, there are some curvilinear areas of intermediate to increased density. These could represent volume averaging with loops of bowel. Other etiologies including some ill-defined hematoma/hemorrhage would be difficult to exclude but thought less likely. Patchy ill-defined opacities in the lung bases may represent atelectasis or developing infiltrates from pneumonia. L4 and L5 are partially fused. No acute fracture identified. Moderate amount of low-attenuation free fluid throughout the abdomen and pelvis of uncertain etiology. Patchy areas of atelectasis versus developing infiltrates involving the right greater than left lung base. Mild urinary bladder wall thickening. This may be related to underdistention. Cystitis not excluded. Unusual curvilinear densities immediately superior to the urinary bladder. While these could represent volume averaging with small bowel loops, they are difficult to definitively connect to bowel and therefore other etiologies including ill-defined hemorrhage or hematoma not entirely excluded and should be correlated with the patient's overall clinical presentation. Short-term follow-up imaging may be useful if indicated. Ct Head Wo Contrast    Result Date: 5/12/2021  EXAMINATION: CT OF THE HEAD WITHOUT CONTRAST  5/12/2021 5:13 am TECHNIQUE: CT of the head was performed without the administration of intravenous contrast. Dose modulation, iterative reconstruction, and/or weight based adjustment of the mA/kV was utilized to reduce the radiation dose to as low as reasonably achievable. COMPARISON: None. HISTORY: ORDERING SYSTEM PROVIDED HISTORY: fall, laceration of the scalp TECHNOLOGIST PROVIDED HISTORY: Reason for exam:->fall, laceration of the scalp Has a \"code stroke\" or \"stroke alert\" been called? ->No Decision Support Exception - unselect if not a suspected or confirmed emergency medical condition->Emergency Medical Condition (MA) FINDINGS: BRAIN/VENTRICLES: There is no acute intracranial hemorrhage, mass effect or midline shift. No abnormal extra-axial fluid collection. The gray-white differentiation is maintained without evidence of an acute infarct. There is no evidence of hydrocephalus. ORBITS: The visualized portion of the orbits demonstrate no acute abnormality. SINUSES: Minimal mucosal thickening scattered in the paranasal sinuses. SOFT TISSUES/SKULL:  No acute abnormality of the visualized skull or soft tissues. No acute intracranial abnormality. Ct Cervical Spine Wo Contrast    Result Date: 5/12/2021  EXAMINATION: CT OF THE CERVICAL SPINE WITHOUT CONTRAST 5/12/2021 5:13 am TECHNIQUE: CT of the cervical spine was performed without the administration of intravenous contrast. Multiplanar reformatted images are provided for review. Dose modulation, iterative reconstruction, and/or weight based adjustment of the mA/kV was utilized to reduce the radiation dose to as low as reasonably achievable. COMPARISON: None. HISTORY: ORDERING SYSTEM PROVIDED HISTORY: fall TECHNOLOGIST PROVIDED HISTORY: Reason for exam:->fall Decision Support Exception - unselect if not a suspected or confirmed emergency medical condition->Emergency Medical Condition (MA) FINDINGS: BONES/ALIGNMENT: There is no acute fracture or traumatic malalignment. DEGENERATIVE CHANGES: No significant degenerative changes. SOFT TISSUES: There is no prevertebral soft tissue swelling. No acute abnormality of the cervical spine.  MRI would be useful if symptoms persist.     LABS  Recent Labs     05/12/21  0402   WBC 8.1   HGB 13.6   HCT 40.1   MCV 84.2        Recent Labs     05/12/21  0402      K 4.1      CO2 18*   BUN 25*   CREATININE 1.9*   GFRAA 37   LABGLOM 31   GLUCOSE 117*   PROT 7.4   LABALBU 4.5   CALCIUM 8.8   BILITOT 0.3   ALKPHOS 95   AST 75*   ALT 35     Lab Results   Component Value Date    COVID19 Not Detected 05/12/2021     COVID-19/MIKE-COV2 LABS  Recent Labs     05/12/21  0402   AST 75*   ALT 35     MICROBIOLOGY:  Cultures :   Blood cultures- pending  Urine cx- pending     Patient is a 81 y.o. male who presented with   Chief Complaint   Patient presents with    Fall    Abdominal Pain      FINAL IMPRESSION      1. Generalized abdominal pain    2. Laceration of scalp, initial encounter    · Rule out abdominal process   · inability to urinate   · NICOLAS   · Hematuria         Plan:   · Currently on Rocephin- increase dose and flagyl- will continue for now   · Received Zosyn   · Attempting to place greenwood cath - may need urology consult   · Cultures pending   · CT reviewed   · For paracentesis   · Monitor labs   · Check HIV and hepatitis, procal , check IGG    Imaging and labs were reviewed per medical records and any ID pertinent labs were addressed with the patient. The patient/FAMILY  was educated about the diagnosis, prognosis, indications, risks and benefits of treatment. An opportunity to ask questions was given to the patient/FAMILY and questions were answered. Thank you for involving me in the care of [facility]. Please do not hesitate to call for any questions or concerns. Electronically signed by CASTRO Bhatti on 5/12/2021 at 11:04 AM        As above    This is a face to face encounter with [facility] on 05/12/21. I have performed and participated in the history, exam, medical decision making, and  POC with the NURSE PRACTITIONER. Imaging and labs were reviewed. [facility] was informed of their diagnosis, indications, risks and benefits of treatment. [facility] had the opportunity to ask questions. All questions were answered.   PTS SON CAME WITH ANOTHER   UROLOGY, HOSPITALIST AND NURSE  PRESENT  PT HAS BEEN HAVING HEMATURIA FOR AT LEAST A WEEK
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
## 2021-05-12 NOTE — ED TRIAGE NOTES
Patient arrived via EMS. Does not speak [place]. From [place]. [place] has several unique dialects, and  system unable to communicate with patient. See note from PEMA Leong RN, who communicated with coworker and provide patient's name, unsure of

## 2021-05-12 NOTE — CONSULTS
5/12/2021 3:16 PM  Service: Urology  Group: [facility] (Hood/Dawn/Kristy)    Bill Landers  [id number]     Chief Complaint:    Gross Hematuria, Urinary Retention     History of Present Illness: The patient is a 81 y.o. male patient who presented to the emergency department early this morning with complaints of abdominal pain and reportedly falling and striking his head. CT abdomen pelvis performed showed free fluid in the abdomen. General surgery was consulted and admitted. Upon arrival to the floor the patient was complaining of abdominal pain. A greenwood catheter was inserted and he had >2500ml of cherry colored urine output. There has been tremendous difficulty obtaining medical history and communicating with the patient due to language barrier. All of the medical history was obtained from the chart. He is apparently an undocumented immigrant from [place]. The  has been unable to determine language due to his unique dialect and there has been no success in obtaining a  specific to his dialect. His son and a friend showed up toward the end of the interview and stated that the patient has been having gross hematuria for approximately two days. He remains with some abdominal pain. Denies any previous history of surgeries. History reviewed. No pertinent past medical history. History reviewed. No pertinent surgical history. Medications Prior to Admission:    No medications prior to admission. Allergies:    Patient has no allergy information on record. Social History:        Family History:   Non-contributory to this Urological problem  family history is not on file. Review of Systems:  Unable to obtain due to language  Barrier     Physical Exam:     Vitals:  /83   Pulse 80   Temp 98.9 °F (37.2 °C) (Oral)   Resp 18   Ht 5' 6\" (1.676 m)   Wt 150 lb (68 kg)   SpO2 97%   BMI 24.21 kg/m²     General:  Awake and alert, no acute distress . HEENT:  Normocephalic, atraumatic. Lungs:  Respirations symmetric and non-labored. Abdomen:  soft, nontender, no masses  Extremities:  No clubbing, cyanosis, or edema  Skin:  Warm and dry, no open lesions or rashes  Neuro: There are no motor or sensory deficits in the 4 quadrant extremities   Rectal: deferred  Genitourinary:  Uncircumcised male, hypospadias, greenwood draining cherry urine     Labs:     Recent Labs     05/12/21  0402   WBC 8.1   RBC 4.76   HGB 13.6   HCT 40.1   MCV 84.2   MCH 28.6   MCHC 33.9   RDW 13.7      MPV 8.8         Recent Labs     05/12/21  0402   CREATININE 1.9*       Imaging:   Narrative   EXAMINATION:   CT OF THE ABDOMEN AND PELVIS WITHOUT CONTRAST 5/12/2021 5:13 am       TECHNIQUE:   CT of the abdomen and pelvis was performed without the administration of   intravenous contrast. Multiplanar reformatted images are provided for review. Dose modulation, iterative reconstruction, and/or weight based adjustment of   the mA/kV was utilized to reduce the radiation dose to as low as reasonably   achievable.       COMPARISON:   None.       HISTORY:   ORDERING SYSTEM PROVIDED HISTORY: abdominal and pelvic pain   TECHNOLOGIST PROVIDED HISTORY:   Reason for exam:->abdominal and pelvic pain   Additional Contrast?->None       FINDINGS:   Exam is limited without intravenous contrast.  There is some streak artifact   in the upper abdomen due to positioning of the patient's arms. Bertell Lacrosse is   homogeneous.  Gallbladder is unremarkable.  Spleen is normal in size.    Pancreas is unremarkable.  No adrenal mass.  No hydronephrosis or calcified   renal stone.       Assessment of bowel is limited without oral contrast.  There is no obvious   evidence of bowel obstruction.  The appendix is not identified with complete   certainty.  Moderate amount of free fluid is identified throughout the   abdomen and the pelvis.  This fluid measures approximately 6 Hounsfield units   in the pelvis and between 0 and 3 Hounsfield units in areas in the abdomen. This is therefore thought to not represent hemoperitoneum.  No abscess or   free air identified.  The urinary bladder is incompletely distended with some   minimal wall thickening.  Just superior to the bladder, there are some   curvilinear areas of intermediate to increased density.  These could   represent volume averaging with loops of bowel.  Other etiologies including   some ill-defined hematoma/hemorrhage would be difficult to exclude but   thought less likely.       Patchy ill-defined opacities in the lung bases may represent atelectasis or   developing infiltrates from pneumonia.       L4 and L5 are partially fused.  No acute fracture identified.           Impression   Moderate amount of low-attenuation free fluid throughout the abdomen and   pelvis of uncertain etiology.       Patchy areas of atelectasis versus developing infiltrates involving the right   greater than left lung base.       Mild urinary bladder wall thickening.  This may be related to   underdistention.  Cystitis not excluded.       Unusual curvilinear densities immediately superior to the urinary bladder. While these could represent volume averaging with small bowel loops, they are   difficult to definitively connect to bowel and therefore other etiologies   including ill-defined hemorrhage or hematoma not entirely excluded and should   be correlated with the patient's overall clinical presentation.  Short-term   follow-up imaging may be useful if indicated.                       Assessment/plan:  Gross Hematuria  Urinary Retention (>2500ml)  UTI   Azotemia     His greenwood was hand irrigated, multiple clots removed. At this point elected to remove the greenwood catheter. Genitalia were prepped and draped in sterile fashion. Xylocaine jelly was injected into his urethra. A 22F 3 way greenwood catheter was inserted into his bladder. The balloon was inflated with 10cc of water.  This was hand irrigated, no clots retrieved. Cont the greenwood  Cont manual irrigations   CT abdomen pelvis reviewed, ? Urachal cyst ? Bladder perforation   CT cystogram ordered  Cytology ordered  NPO  Will follow           Electronically signed by CASTRO Alas CNP on 5/12/2021 at 3:16 PM           The patient was seen and examined. I have reviewed the medical record in detail. I agree with the plan as outlined by JULIEN Alas. His CT cystogram was reviewed. There is an obvious intraperitoneal bladder rupture. The catheter appears to be malpositioned. His abdomen is soft but he is tender. I discussed the situation with the radiologist as well as Dr. Amanda Freed. I feel he requires urgent exploration and repair of the bladder injury with Greenwood repositioning.   The risks and benefits of the surgery were discussed in detail using Google translate    Caroline Brown MD  8:51 PM  5/12/2021

## 2021-05-12 NOTE — ED NOTES
Patient speak a dialect of [place] called \"IXIL, or ixil. \" This information was provided by the patient's coworker whom called the ambulance for the patient. The coworker is Dinora Horn and can be contacted at [phone]. Sanjuanadior Bello is [place] speaking and a  is needed to better understand him. All information thus far has been provided by Tamie Monsalve due to the Ixil  not being available at this time. Tamie Monsalve stated that the patient fell down some steps at some point today wants to be checked out. Patient presents with a laceration to the back of the head. Patient is pointing to his private area stating what sounds to be \"infection\". Tamie Monsalve stated that they are living at [address] as no way of picking the patient up because he does not have a car. He is requesting that the patient get a taxi to that location when he is discharged and that he can pay for everything and everything the patient might need.       Sona Huerta RN  05/12/21 [id number]

## 2021-05-12 NOTE — CONSULTS
[facility]      Reason for Consult:  Medical management    History of Present Illness:  81 y.o. male with unknown past medical history. He is from [place], and does not speak [place]. Multiple attempts have been done to use video  service, but he speaks an uncommon dialect. History obtained from chart review. Apparently he was working at [employer] when he fell and hit his head. Ethanol screen was positive, but only at 77 mg/dL. The ED he appeared to have abdominal tenderness, so CT of the abdomen pelvis was obtained which showed free fluid in the pelvis as well as a small amount of ascites. Labs significant for creatinine of 1.9. Lactic acid mildly elevated at 2.4. Urine toxicology screen negative. CBC unremarkable. Urinalysis does suggest urinary tract infection. Informant(s) for H&P: Chart review    REVIEW OF SYSTEMS:  Unable to obtain reliable review of systems due to language barrier    PMH:  History reviewed. No pertinent past medical history. Surgical History:  History reviewed. No pertinent surgical history. Medications Prior to Admission:    Prior to Admission medications    Not on File       Allergies:    Patient has no allergy information on record. Social History:        Family History:   Unable to obtain family history due to language barrier and no  available    PHYSICAL EXAM:  Vitals:  /83   Pulse 80   Temp 98.9 °F (37.2 °C) (Oral)   Resp 18   Ht 5' 6\" (1.676 m)   Wt 150 lb (68 kg)   SpO2 97%   BMI 24.21 kg/m²     General Appearance: Awake, alert, does not appear to be in any distress.   Skin: warm and dry  Head: normocephalic and atraumatic  Eyes: pupils equal, round, and reactive to light, extraocular eye movements intact, conjunctivae normal  Neck: neck supple and non tender without mass   Pulmonary/Chest: clear to auscultation bilaterally- no wheezes, rales or rhonchi, normal air movement, no respiratory distress  Cardiovascular: normal rate, normal S1 and S2 and no carotid bruits  Abdomen: Normal bowel sounds. Abdomen appears tender on palpation with some guarding, but there does not appear to be rebound tenderness. Extremities: no cyanosis, no clubbing and no edema  Neurologic: Cranial nerves II through XII appear to be grossly intact. Moves all extremities equally. LABS:  Recent Labs     05/12/21  0402      K 4.1      CO2 18*   BUN 25*   CREATININE 1.9*   GLUCOSE 117*   CALCIUM 8.8       Recent Labs     05/12/21  0402   WBC 8.1   RBC 4.76   HGB 13.6   HCT 40.1   MCV 84.2   MCH 28.6   MCHC 33.9   RDW 13.7      MPV 8.8       No results for input(s): POCGLU in the last 72 hours. Radiology:   US ABDOMEN LIMITED   Final Result   Limited sonographic evaluation of the abdomen and pelvis revealed no ascites. As such, paracentesis was not performed. CT HEAD WO CONTRAST   Final Result   No acute intracranial abnormality. CT CERVICAL SPINE WO CONTRAST   Final Result   No acute abnormality of the cervical spine. MRI would be useful if symptoms   persist.         CT ABDOMEN PELVIS WO CONTRAST Additional Contrast? None   Final Result   Moderate amount of low-attenuation free fluid throughout the abdomen and   pelvis of uncertain etiology. Patchy areas of atelectasis versus developing infiltrates involving the right   greater than left lung base. Mild urinary bladder wall thickening. This may be related to   underdistention. Cystitis not excluded. Unusual curvilinear densities immediately superior to the urinary bladder.    While these could represent volume averaging with small bowel loops, they are   difficult to definitively connect to bowel and therefore other etiologies   including ill-defined hemorrhage or hematoma not entirely excluded and should   be correlated with the patient's overall clinical

## 2021-05-13 PROBLEM — N32.89 INTRAPERITONEAL RUPTURE OF BLADDER: Status: ACTIVE | Noted: 2021-05-13

## 2021-05-13 LAB
ALBUMIN SERPL-MCNC: 3.8 G/DL (ref 3.5–5.2)
ALP BLD-CCNC: 69 U/L (ref 40–129)
ALT SERPL-CCNC: 27 U/L (ref 0–40)
ANION GAP SERPL CALCULATED.3IONS-SCNC: 10 MMOL/L (ref 7–16)
AST SERPL-CCNC: 41 U/L (ref 0–39)
BASOPHILS ABSOLUTE: 0 E9/L (ref 0–0.2)
BASOPHILS RELATIVE PERCENT: 0.2 % (ref 0–2)
BILIRUB SERPL-MCNC: 0.5 MG/DL (ref 0–1.2)
BILIRUBIN DIRECT: <0.2 MG/DL (ref 0–0.3)
BILIRUBIN, INDIRECT: ABNORMAL MG/DL (ref 0–1)
BUN BLDV-MCNC: 12 MG/DL (ref 6–20)
CALCIUM SERPL-MCNC: 8.8 MG/DL (ref 8.6–10.2)
CHLORIDE BLD-SCNC: 102 MMOL/L (ref 98–107)
CO2: 23 MMOL/L (ref 22–29)
CREAT SERPL-MCNC: 0.6 MG/DL (ref 0.7–1.2)
EOSINOPHILS ABSOLUTE: 0 E9/L (ref 0.05–0.5)
EOSINOPHILS RELATIVE PERCENT: 0 % (ref 0–6)
GFR AFRICAN AMERICAN: >60
GFR NON-AFRICAN AMERICAN: >60 ML/MIN/1.73
GLUCOSE BLD-MCNC: 167 MG/DL (ref 74–99)
HCT VFR BLD CALC: 35.7 % (ref 37–54)
HEMOGLOBIN: 12.2 G/DL (ref 12.5–16.5)
IGA: 228 MG/DL (ref 70–400)
IGG: 1104 MG/DL (ref 700–1600)
IGM: 75 MG/DL (ref 40–230)
LYMPHOCYTES ABSOLUTE: 0.23 E9/L (ref 1.5–4)
LYMPHOCYTES RELATIVE PERCENT: 3.5 % (ref 20–42)
MCH RBC QN AUTO: 29 PG (ref 26–35)
MCHC RBC AUTO-ENTMCNC: 34.2 % (ref 32–34.5)
MCV RBC AUTO: 85 FL (ref 80–99.9)
MONOCYTES ABSOLUTE: 0.12 E9/L (ref 0.1–0.95)
MONOCYTES RELATIVE PERCENT: 1.7 % (ref 2–12)
NEUTROPHILS ABSOLUTE: 5.51 E9/L (ref 1.8–7.3)
NEUTROPHILS RELATIVE PERCENT: 94.8 % (ref 43–80)
PDW BLD-RTO: 13.6 FL (ref 11.5–15)
PLATELET # BLD: 282 E9/L (ref 130–450)
PMV BLD AUTO: 9.3 FL (ref 7–12)
POTASSIUM REFLEX MAGNESIUM: 4.5 MMOL/L (ref 3.5–5)
RBC # BLD: 4.2 E12/L (ref 3.8–5.8)
SODIUM BLD-SCNC: 135 MMOL/L (ref 132–146)
TOTAL PROTEIN: 6.5 G/DL (ref 6.4–8.3)
WBC # BLD: 5.8 E9/L (ref 4.5–11.5)

## 2021-05-13 PROCEDURE — 85025 COMPLETE CBC W/AUTO DIFF WBC: CPT

## 2021-05-13 PROCEDURE — 6360000002 HC RX W HCPCS: Performed by: SURGERY

## 2021-05-13 PROCEDURE — 80074 ACUTE HEPATITIS PANEL: CPT

## 2021-05-13 PROCEDURE — 6370000000 HC RX 637 (ALT 250 FOR IP): Performed by: SURGERY

## 2021-05-13 PROCEDURE — 84311 SPECTROPHOTOMETRY: CPT

## 2021-05-13 PROCEDURE — 80048 BASIC METABOLIC PNL TOTAL CA: CPT

## 2021-05-13 PROCEDURE — 80076 HEPATIC FUNCTION PANEL: CPT

## 2021-05-13 PROCEDURE — 96374 THER/PROPH/DIAG INJ IV PUSH: CPT

## 2021-05-13 PROCEDURE — 86481 TB AG RESPONSE T-CELL SUSP: CPT

## 2021-05-13 PROCEDURE — 2580000003 HC RX 258: Performed by: SURGERY

## 2021-05-13 PROCEDURE — 86703 HIV-1/HIV-2 1 RESULT ANTBDY: CPT

## 2021-05-13 PROCEDURE — C9113 INJ PANTOPRAZOLE SODIUM, VIA: HCPCS | Performed by: SURGERY

## 2021-05-13 PROCEDURE — 36415 COLL VENOUS BLD VENIPUNCTURE: CPT

## 2021-05-13 PROCEDURE — 96376 TX/PRO/DX INJ SAME DRUG ADON: CPT

## 2021-05-13 PROCEDURE — 82784 ASSAY IGA/IGD/IGG/IGM EACH: CPT

## 2021-05-13 PROCEDURE — 2500000003 HC RX 250 WO HCPCS: Performed by: SURGERY

## 2021-05-13 PROCEDURE — 99225 PR SBSQ OBSERVATION CARE/DAY 25 MINUTES: CPT | Performed by: INTERNAL MEDICINE

## 2021-05-13 PROCEDURE — G0378 HOSPITAL OBSERVATION PER HR: HCPCS

## 2021-05-13 PROCEDURE — 82785 ASSAY OF IGE: CPT

## 2021-05-13 RX ADMIN — METRONIDAZOLE 500 MG: 500 INJECTION, SOLUTION INTRAVENOUS at 19:31

## 2021-05-13 RX ADMIN — MORPHINE SULFATE 4 MG: 4 INJECTION, SOLUTION INTRAMUSCULAR; INTRAVENOUS at 04:22

## 2021-05-13 RX ADMIN — MORPHINE SULFATE 4 MG: 4 INJECTION, SOLUTION INTRAMUSCULAR; INTRAVENOUS at 15:30

## 2021-05-13 RX ADMIN — METRONIDAZOLE 500 MG: 500 INJECTION, SOLUTION INTRAVENOUS at 04:24

## 2021-05-13 RX ADMIN — METRONIDAZOLE 500 MG: 500 INJECTION, SOLUTION INTRAVENOUS at 13:07

## 2021-05-13 RX ADMIN — OXYCODONE HYDROCHLORIDE AND ACETAMINOPHEN 1 TABLET: 5; 325 TABLET ORAL at 17:22

## 2021-05-13 RX ADMIN — PANTOPRAZOLE SODIUM 40 MG: 40 INJECTION, POWDER, FOR SOLUTION INTRAVENOUS at 08:53

## 2021-05-13 RX ADMIN — OXYCODONE HYDROCHLORIDE AND ACETAMINOPHEN 1 TABLET: 5; 325 TABLET ORAL at 23:22

## 2021-05-13 RX ADMIN — CEFTRIAXONE 2000 MG: 2 INJECTION, SOLUTION INTRAVENOUS at 11:35

## 2021-05-13 RX ADMIN — MORPHINE SULFATE 4 MG: 4 INJECTION, SOLUTION INTRAMUSCULAR; INTRAVENOUS at 19:31

## 2021-05-13 RX ADMIN — SODIUM CHLORIDE, PRESERVATIVE FREE 10 ML: 5 INJECTION INTRAVENOUS at 08:53

## 2021-05-13 ASSESSMENT — PAIN SCALES - GENERAL
PAINLEVEL_OUTOF10: 7
PAINLEVEL_OUTOF10: 8
PAINLEVEL_OUTOF10: 3
PAINLEVEL_OUTOF10: 7

## 2021-05-13 NOTE — PROGRESS NOTES
contacted via language line, Through  explained that we would be irrigating greenwood catheter frequently. Pt given opportunity through  to ask questions, only question from pt was when could he return to work. Explained that would be up to the physician. Pt had no other questions per .

## 2021-05-13 NOTE — PROGRESS NOTES
[facility] PROGRESS NOTE                                                                       5/13/2021          SUBJECTIVE:    Afebrile  Urine grossly clear  Cath draining well      OBJECTIVE:    /65   Pulse 78   Temp 98.9 °F (37.2 °C) (Oral)   Resp 18   Ht 5' 6\" (1.676 m)   Wt 150 lb (68 kg)   SpO2 96%   BMI 24.21 kg/m²     PHYSICAL EXAMINATION:  Skin: dry, without rashes  Respirations: non-labored, intact  Abdomen: some distention and tenderness  Wounds ok        Lab Results   Component Value Date    WBC 5.8 05/13/2021    HGB 12.2 (L) 05/13/2021    HCT 35.7 (L) 05/13/2021    MCV 85.0 05/13/2021     05/13/2021       Lab Results   Component Value Date    CREATININE 0.6 (L) 05/13/2021       No results found for: PSA    REVIEW OF SYSTEMS:    CONSTITUTIONAL: negative  HEENT: negative  HEMATOLOGIC: negative  ENDOCRINE: negative  RESPIRATORY: negative  CV: negative  GI: negative  NEURO: negative  ORTHOPEDICS: negative  PSYCHIATRIC: negative  : as above    PAST FAMILY HISTORY:  History reviewed. No pertinent family history.   PAST SOCIAL HISTORY:    Social History     Socioeconomic History    Marital status: Legally      Spouse name: None    Number of children: None    Years of education: None    Highest education level: None   Occupational History    None   Social Needs    Financial resource strain: None    Food insecurity     Worry: None     Inability: None    Transportation needs     Medical: None     Non-medical: None   Tobacco Use    Smoking status: None   Substance and Sexual Activity    Alcohol use: None    Drug use: None    Sexual activity: None   Lifestyle    Physical activity     Days per week: None     Minutes per session: None    Stress: None   Relationships    Social connections     Talks on phone: None     Gets together: None     Attends Yazidism service: None     Active member of club or organization: None     Attends meetings of clubs or organizations: None     Relationship status: None    Intimate partner violence     Fear of current or ex partner: None     Emotionally abused: None     Physically abused: None     Forced sexual activity: None   Other Topics Concern    None   Social History Narrative    None       Scheduled Meds:   sodium chloride flush  10 mL Intravenous 2 times per day    metroNIDAZOLE  500 mg Intravenous Q8H    pantoprazole  40 mg Intravenous Daily    And    sodium chloride (PF)  10 mL Intravenous Daily    [Held by provider] heparin (porcine)  5,000 Units Subcutaneous 3 times per day    cefTRIAXone  2,000 mg Intravenous Q24H     Continuous Infusions:   lactated ringers 125 mL/hr at 05/12/21 1145    sodium chloride       PRN Meds:.sodium chloride flush, sodium chloride, morphine **OR** morphine, ondansetron **OR** ondansetron, oxyCODONE-acetaminophen, opium-belladonna    ASSESSMENT:    Patient Active Problem List   Diagnosis    Abdominal pain    NICOLAS (acute kidney injury) ([facility] [address]    Free fluid in pelvis    Ascites    Hematuria    Urinary tract infection with hematuria       PLAN:  Dave in place for two weeks  Will do cystogram prior to removal      Emmett Gordillo M.D.  5/13/2021  12:24 PM

## 2021-05-13 NOTE — PROGRESS NOTES
[address] [place], [place] [address]ist   Progress Note    Admitting Date and Time: 5/12/2021  3:27 AM  Admit Dx: Abdominal pain [R10.9]    Subjective/interval history:    Pt had emergency laparoscopy and repair of urinary bladder perforation last night after CT cystogram with contrast confirmed diagnosis. Per RN: She was able to speak with patient via  that speaks his dialect. He denies any complaints, and only wants to know when he can return to work. ROS: Unable to obtain reliable review of systems due to language barrier     sodium chloride flush  10 mL Intravenous 2 times per day    metroNIDAZOLE  500 mg Intravenous Q8H    pantoprazole  40 mg Intravenous Daily    And    sodium chloride (PF)  10 mL Intravenous Daily    [Held by provider] heparin (porcine)  5,000 Units Subcutaneous 3 times per day    cefTRIAXone  2,000 mg Intravenous Q24H     sodium chloride flush, 10 mL, PRN  sodium chloride, 25 mL, PRN  morphine, 2 mg, Q4H PRN    Or  morphine, 4 mg, Q4H PRN  ondansetron, 4 mg, Q8H PRN    Or  ondansetron, 4 mg, Q6H PRN  oxyCODONE-acetaminophen, 1 tablet, Q6H PRN  opium-belladonna, 60 mg, Q8H PRN         Objective:    /74   Pulse 71   Temp 98.6 °F (37 °C)   Resp 18   Ht 5' 6\" (1.676 m)   Wt 150 lb (68 kg)   SpO2 97%   BMI 24.21 kg/m²   General Appearance: Awake, alert, does not appear to be in any distress. Skin: warm and dry  Head: normocephalic and atraumatic  Eyes: pupils equal, round, and reactive to light, extraocular eye movements intact, conjunctivae normal  Neck: neck supple and non tender without mass   Pulmonary/Chest: clear to auscultation bilaterally- no wheezes, rales or rhonchi, normal air movement, no respiratory distress  Cardiovascular: normal rate, normal S1 and S2 and no carotid bruits  Abdomen:  Laparoscopic incisions clean, dry, intact. Drain in place. Normal bowel sounds. Abdomen appears tender on palpation without guarding.    Extremities: no cyanosis, no clubbing and no edema  Neurologic: Cranial nerves II through XII appear to be grossly intact. Moves all extremities equally. Recent Labs     05/12/21 0402 05/13/21 0439    135   K 4.1 4.5    102   CO2 18* 23   BUN 25* 12   CREATININE 1.9* 0.6*   GLUCOSE 117* 167*   CALCIUM 8.8 8.8       Recent Labs     05/12/21 0402 05/13/21 0439   ALKPHOS 95 69   PROT 7.4 6.5   LABALBU 4.5 3.8   BILITOT 0.3 0.5   AST 75* 41*   ALT 35 27       Recent Labs     05/12/21 0402 05/13/21 0439   WBC 8.1 5.8   RBC 4.76 4.20   HGB 13.6 12.2*   HCT 40.1 35.7*   MCV 84.2 85.0   MCH 28.6 29.0   MCHC 33.9 34.2   RDW 13.7 13.6    282   MPV 8.8 9.3       Radiology:   CT CYSTOGRAM W CONTRAST   Final Result   Extensive extravasation of administered contrast via urinary bladder catheter   consistent with urinary bladder rupture-perforation. Punctate foci of   extraluminal air are also seen in the pelvis. Findings reported to Dr. Bong Garcia at 8:54 p.m. XR WRIST RIGHT (MIN 3 VIEWS)   Final Result   Soft tissue swelling without obvious fracture. MRI or CT would be useful if   symptoms persist.         US ABDOMEN LIMITED   Final Result   Limited sonographic evaluation of the abdomen and pelvis revealed no ascites. As such, paracentesis was not performed. CT HEAD WO CONTRAST   Final Result   No acute intracranial abnormality. CT CERVICAL SPINE WO CONTRAST   Final Result   No acute abnormality of the cervical spine. MRI would be useful if symptoms   persist.         CT ABDOMEN PELVIS WO CONTRAST Additional Contrast? None   Final Result   Moderate amount of low-attenuation free fluid throughout the abdomen and   pelvis of uncertain etiology. Patchy areas of atelectasis versus developing infiltrates involving the right   greater than left lung base. Mild urinary bladder wall thickening. This may be related to   underdistention. Cystitis not excluded.       Unusual curvilinear densities immediately superior to the urinary bladder. While these could represent volume averaging with small bowel loops, they are   difficult to definitively connect to bowel and therefore other etiologies   including ill-defined hemorrhage or hematoma not entirely excluded and should   be correlated with the patient's overall clinical presentation. Short-term   follow-up imaging may be useful if indicated. Assessment/Plan:  Principal Problem:    Intraperitoneal rupture of bladder  Active Problems:    Abdominal pain    NICOLAS (acute kidney injury) ([address]    Free fluid in pelvis    Ascites    Hematuria    Urinary tract infection with hematuria  Resolved Problems:    * No resolved hospital problems. *      1. Bladder rupture  -Status post laparoscopic repair postoperative day #1  -Postoperative care management per general surgery and urology     2. Urinary tract infection with hematuria  -On ceftriaxone and Flagyl  -Urine and blood culture showed no growth today     3. Acute kidney injury  -Resolved with IV fluid hydration and repair of bladder up    NOTE: This report was transcribed using voice recognition software. Every effort was made to ensure accuracy; however, inadvertent computerized transcription errors may be present.      Electronically signed by Jon Whipple DO on 5/13/2021 at 2:03 PM

## 2021-05-13 NOTE — ANESTHESIA PRE PROCEDURE
Department of Anesthesiology  Preprocedure Note       Name:  Shoshana Tejeda   Age:  81 y.o.  :  1887                                          MRN:  [id number]         Date:  2021      Surgeon: Taz Stafford):  MD Bisi Freeman MD    Procedure: Procedure(s):   HERNIA INGUINAL REPAIR LAPAROSCOPIC ROBOTIC    Medications prior to admission:   Prior to Admission medications    Not on File       Current medications:    Current Facility-Administered Medications   Medication Dose Route Frequency Provider Last Rate Last Admin    lactated ringers infusion   Intravenous Continuous Bisi Malcolm  mL/hr at 21 1145 New Bag at 21 1145    sodium chloride flush 0.9 % injection 10 mL  10 mL Intravenous 2 times per day Bisi Malcolm MD        sodium chloride flush 0.9 % injection 10 mL  10 mL Intravenous PRN Bisi Malcolm MD        0.9 % sodium chloride infusion  25 mL Intravenous PRN Bisi Malcolm MD        metronidazole (FLAGYL) 500 mg in NaCl 100 mL IVPB premix  500 mg Intravenous Niru Fisher MD   Stopped at 21 2040    morphine (PF) injection 2 mg  2 mg Intravenous Q4H PRN Bisi Malcolm MD        Or    morphine injection 4 mg  4 mg Intravenous Q4H PRN Bisi Malcolm MD   4 mg at 21 1723    pantoprazole (PROTONIX) injection 40 mg  40 mg Intravenous Daily Bisi Malcolm MD   40 mg at 21 1146    And    sodium chloride (PF) 0.9 % injection 10 mL  10 mL Intravenous Daily Bisi Malcolm MD   10 mL at 21 1146    ondansetron (ZOFRAN-ODT) disintegrating tablet 4 mg  4 mg Oral Q8H PRN Bisi Malcolm MD        Or    on[facility]etron [facility] injection 4 mg  4 mg Intravenous Q6H PRN Bisi Malcolm MD       [facility] [place] by provider] heparin (porcine) injection 5,000 Units  5,000 Units Subcutaneous 3 times per day Bisi Malcolm MD   5,000 Units at 21 1945    cefTRIAXone (ROCEPHIN) 2000 mg in 50 mL IVPB (premix) 2,000 mg Intravenous Q24H CASTRO Vargas - CNS   Stopped at 05/12/21 1426       Allergies:  No Known Allergies    Problem List:    Patient Active Problem List   Diagnosis Code    Abdominal pain R10.9    NICOLAS (acute kidney injury) ([facility] [address] N17.9    Free fluid in pelvis R18.8    Ascites R18.8    Hematuria R31.9    Urinary tract infection with hematuria N39.0, R31.9       Past Medical History:  History reviewed. No pertinent past medical history. Past Surgical History:  History reviewed. No pertinent surgical history. Social History:    Social History     Tobacco Use    Smoking status: Not on file   Substance Use Topics    Alcohol use: Not on file                                Counseling given: Not Answered      Vital Signs (Current):   Vitals:    05/12/21 0529 05/12/21 0612 05/12/21 0737 05/12/21 1853   BP: 121/74 (!) 143/79 137/83 130/78   Pulse: 87 91 80 83   Resp: 18 18 18 18   Temp:   98.9 °F (37.2 °C) 99.2 °F (37.3 °C)   TempSrc:   Oral Oral   SpO2: 97% 95% 97% 95%   Weight:       Height:                                                  BP Readings from Last 3 Encounters:   05/12/21 130/78       NPO Status:  5/12/21 1400 according to nursing staff                                                                               BMI:   Wt Readings from Last 3 Encounters:   05/12/21 150 lb (68 kg)     Body mass index is 24.21 kg/m².     CBC:   Lab Results   Component Value Date    WBC 8.1 05/12/2021    RBC 4.76 05/12/2021    HGB 13.6 05/12/2021    HCT 40.1 05/12/2021    MCV 84.2 05/12/2021    RDW 13.7 05/12/2021     05/12/2021       CMP:   Lab Results   Component Value Date     05/12/2021    K 4.1 05/12/2021     05/12/2021    CO2 18 05/12/2021    BUN 25 05/12/2021    CREATININE 1.9 05/12/2021    GFRAA 37 05/12/2021    LABGLOM 31 05/12/2021    GLUCOSE 117 05/12/2021    PROT 7.4 05/12/2021    CALCIUM 8.8 05/12/2021    BILITOT 0.3 05/12/2021    ALKPHOS 95 05/12/2021    AST 75

## 2021-05-13 NOTE — BRIEF OP NOTE
Brief Postoperative Note      Patient: Elly Recio  YOB: 1887  MRN: [id number]    Date of Procedure: 5/12/2021    Pre-Op Diagnosis: Ruptured bladder    Post-Op Diagnosis: Same       Procedure(s):  ROBOTIC EXPLORATION OF ABDOMEN WITH REPAIR OF BLADDER RUPTURE    Surgeon(s):  MD Hilary Parisi MD    Assistant:  Surgical Assistant: Parth Ch    Anesthesia: General    Estimated Blood Loss (mL): Minimal    Complications: None    Specimens:   * No specimens in log *    Implants:  * No implants in log *      Drains:   Closed/Suction Drain Inferior;Medial Abdomen (Active)       Urethral Catheter Straight-tip 16 fr (Active)   $ Urethral catheter insertion $ Not inserted for procedure 05/12/21 1050   Catheter Indications Urinary retention (acute or chronic), continuous bladder irrigation or bladder outlet obstruction 05/12/21 1050   Site Assessment No urethral drainage 05/12/21 1050   Urine Color Bloody 05/12/21 1943   Urine Appearance Clots 05/12/21 1943   Output (mL) 650 mL 05/12/21 1943       Findings: Intraperitoneal bladder rupture    Electronically signed by Tete Oliver MD on 5/12/2021 at 10:28 PM

## 2021-05-13 NOTE — OP NOTE
Operative Note      Patient: Elly Grimes  YOB: 1887  MRN: [id number]    Date of Procedure: 5/12/2021    Pre-Op Diagnosis: Ruptured bladder    Post-Op Diagnosis: Hemoperitoneum, Free intraperitoneal bladder rupture       Procedure(s):  Diagnostic laparoscopy with robotic assisted had abdominal exploration and washout; primarily repair of bladder rupture by urology    Surgeon(s):  MD Dariela Roman MD    Assistant:   Surgical Assistant: Kandis Cr    Anesthesia: General    Estimated Blood Loss (mL): 5cc    Complications: None        Drains:   Closed/Suction Drain Inferior;Medial Abdomen (Active)       Urethral Catheter Straight-tip 16 fr (Active)   $ Urethral catheter insertion $ Not inserted for procedure 05/12/21 1050   Catheter Indications Urinary retention (acute or chronic), continuous bladder irrigation or bladder outlet obstruction 05/12/21 1050   Site Assessment No urethral drainage 05/12/21 1050   Urine Color Bloody 05/12/21 1943   Urine Appearance Clots 05/12/21 1943   Output (mL) 650 mL 05/12/21 1943       Findings:                Detailed Description of Procedure: This is a 50-year-old male with a history of remote fall intoxicated. He is non-English-speaking and very difficult to communicate so the story was very incomplete. He presented to the emergency department abdominal pain and CT showed free fluid in the abdomen. Further work-up revealed hematuria with massive intraperitoneal free fluid consistent with bladder rupture. After being spinners benefits alternatives procedure he agreed to proceed. Consultation was obtained from urology who assisted in diagnosis and also recommended proceeding OR for repair. Patient was taken the operating placed upon administered general anesthesia. He was intubated once he was out of the state he was prepped and draped normal sterile fashion.   Timeout was performed to confirm surgical site and the patient's name.  He had received IV antibiotics during his admission did not receive any more prior to procedure. He was placed in lithotomy position. He already had a Dave catheter existing. Dave catheter was clamped we made a supraumbilical 8 mm incision vertically inserted Veress needle confirmed being placed insufflated 15 mmHg. The Veress needle inserted 8 mm robotic trocar. Inserted camera fall inspected the abdomen. Immediately could appreciate hemoperitoneum down in the pelvis. There was really very little fluid on the upper abdomen. There is no evidence of succus bowel perforation. The liver appeared normal there was no evidence of injury to the spleen. Patient was placed in 18 degrees of Trendelenburg and 2 more 8 mm trochars were inserted 1 in the right middle abdomen the left middle abdomen. Under direct visualization we were able to appreciate a very large opening in the dome of the bladder. Consistent with an acute bladder rupture. There was some clot around it we could easily drop the camera down into the bladder and visualize the Dave catheter. Urology assist in this point diagnosis of the bladder rupture did not appear to be involving the trigone nor did involve the ureter orifices. We then docked the robot. I turned the field over to urology who performed a primary closure of the bladder using a 9 inch absorbable V-Loc suture. Dave catheter was confirmed be in place prior to completion of the closure. I then used suction  to irrigate out the pelvis until the fluid returned clear. We then placed a 15 [place] Zackery drain down within the pelvis. Then inspected the bowel further and there was no evidence of any injury to bowel there was no hyperemia of the bowel was no evidence of any bruising puncture violation of the serosa of any of the bowel. This point we were satisfied with closure of the bladder and the Dave catheter was left in place.   We then removed each one of the trochars decompress the abdomen secured the 15 [place] Zackery drain in the right middle abdomen with a 2-0 nylon stitch. We then closed each one of the skin incisions with 4 Monocryl and surgical glue.     Electronically signed by Bisi Malcolm MD on 5/12/2021 at 10:28 PM

## 2021-05-13 NOTE — ANESTHESIA POSTPROCEDURE EVALUATION
Department of Anesthesiology  Postprocedure Note    Patient: Keya Contreras  MRN: [id number]  YOB: 1887  Date of evaluation: 5/13/2021  Time:  10:52 AM     Procedure Summary     Date: 05/12/21 Room / Location: [address] [place] [address] 06 / [address] [facility]    Anesthesia Start: 2138 Anesthesia Stop: 2240    Procedure: ROBOTIC EXPLORATION OF ABDOMEN WITH REPAIR OF BLADDER RUPTURE (N/A Abdomen) Diagnosis: (Ruptured bladder)    Surgeons: Dilshad Ny MD Responsible Provider: Marisol Rg DO    Anesthesia Type: general ASA Status: 2 - Emergent          Anesthesia Type: general    Junior Phase I: Junior Score: 10    Junior Phase II:      Last vitals: Reviewed and per EMR flowsheets.        Anesthesia Post Evaluation    Patient location during evaluation: PACU  Patient participation: complete - patient participated  Level of consciousness: awake and alert  Airway patency: patent  Nausea & Vomiting: no nausea and no vomiting  Complications: no  Cardiovascular status: hemodynamically stable  Respiratory status: acceptable  Hydration status: euvolemic

## 2021-05-13 NOTE — OP NOTE
[address] [facility]  Urology Post-operative Note    Chetan Garcia  YOB: 1887  [id number]    Pre-operative Diagnosis: Intraperitoneal bladder rupture, gross hematuria    Post-operative Diagnosis:  Same    Procedure: Robotic assisted laparoscopic cystorrhaphy, pelvic drain placement    Surgeon: Martha Soriano MD,  Flynn Arango, Nurse Practitioner    Anesthesia:   General    Estimated Blood Loss:   Minimal    Complications: None    Drains: Dave catheter to gravity drainage, round Héctor-Ashraf drain to bulb suction    Specimen(s): None    Clinical Note:   16-year-old [place] male with limited English was admitted with abdominal pain. CT scan without contrast identified free fluid in the abdominal cavity. His bladder was decompressed of approximately 3 L of urine. He began having gross hematuria. His Dave was changed. A CT cystogram was performed showing an extensive intraperitoneal bladder rupture with contrast extravasation into the abdominal cavity. He presents for the above listed procedure. The risks and benefits of the procedure including but not limited to infection, bleeding, possible need to convert to an open procedure etc. were discussed as best as possible with the patient using Google translate    Operative Description:   He received IV Rocephin and Flagyl preoperatively. He was taken to the operating room and placed on the OR table supine position. Following induction of general anesthesia he was repositioned into the dorsolithotomy position in [facility]. His abdomen and genitalia were prepped and draped sterilely. A Dave catheter was kept indwelling and clamped. Dr. Curtis Isaac was present for the procedure and proceeded to place 3 robotic trochars. One in the midline above the umbilicus and 2 handbreadth lateral to this. Exploratory laparoscopy was performed with a 5 mm robotic camera. A separative operative report will follow by Dr. Curtis Isaac. Inspection showed his bowel liver and spleen to be intact. Looking down in the pelvis there was some free fluid in the cul-de-sac. There was a large stellate tear across the entire dome of the bladder. Looking into this, the bladder was intact otherwise with the Dave catheter present at its depth. The ureteral orifice ease were not injured in the perforation. A 2 OV lock suture was inserted into the abdominal cavity. The bladder was closed with full-thickness running closure of the V lock suture making it watertight. This was done until there was complete cystorrhaphy. A suction  was then passed into the pelvis and the free fluid was irrigated. A round Héctor-Ashraf drain was then passed through the right lateral trocar and into the pelvis. Pneumo insufflation was taken down and the abdomen decompressed. The Dave was unclamped. The trochars were each removed and the incisions closed by Dr. See Martinez surgical assistant. He will dictate a separative operative report. The drain was connected to bulb suction.   He otherwise tolerated this procedure well, was expanded, was taken to the PACU in stable condition      Robert Garrido MD  5/12/2021  10:30 PM

## 2021-05-13 NOTE — PROGRESS NOTES
[facility]  [facility]  Progress Note    NAME: Mir Kent  MR:  [id number]  :   1887  DATE OF SERVICE:21    This is a face to face encounter with Mir Kent 81 y.o. male on 21    CHIEF COMPLAINT     ID following for   Chief Complaint   Patient presents with    Fall    Abdominal Pain     HISTORY OF PRESENT ILLNESS   S/p Diagnostic laparoscopy with robotic assisted had abdominal exploration and washout; primarily repair of bladder rupture by urology  Has drain feels ok   Dave yellow   Limited English  Patient is tolerating medications. No reported adverse drug reactions. REVIEW OF SYSTEMS     As stated above in the chief complaint, otherwise negative. CURRENT MEDICATIONS      sodium chloride flush  10 mL Intravenous 2 times per day    metroNIDAZOLE  500 mg Intravenous Q8H    pantoprazole  40 mg Intravenous Daily    And    sodium chloride (PF)  10 mL Intravenous Daily    [Held by provider] heparin (porcine)  5,000 Units Subcutaneous 3 times per day    cefTRIAXone  2,000 mg Intravenous Q24H     Continuous Infusions:   lactated ringers 125 mL/hr at 21 1145    sodium chloride       PRN Meds:sodium chloride flush, sodium chloride, morphine **OR** morphine, ondansetron **OR** ondansetron, oxyCODONE-acetaminophen, opium-belladonna    PHYSICAL EXAM     /65   Pulse 78   Temp 98.9 °F (37.2 °C) (Oral)   Resp 18   Ht 5' 6\" (1.676 m)   Wt 150 lb (68 kg)   SpO2 96%   BMI 24.21 kg/m²   Temp  Av.5 °F (36.9 °C)  Min: 97 °F (36.1 °C)  Max: 99.2 °F (37.3 °C)  Constitutional:  The patient is awake, alert, and oriented. Skin:    Warm and dry. No rashes were noted. HEENT:   Round and reactive pupils. AT/NC  Neck:    Supple to movements. Chest:   No use of accessory muscles to breathe. Symmetrical expansion. Cardiovascular:  S1 and S2 are rhythmic and regular. No murmurs appreciated.    Abdomen:   Positive bowel sounds to auscultation. Benign to palpation drain serosang. Extremities:   No clubbing, no cyanosis, no edema. CNS    AAxO   Lines: piv  Dave       DIAGNOSTIC RESULTS   Radiology:    Recent Labs     05/12/21  0402 05/13/21  0439   WBC 8.1 5.8   RBC 4.76 4.20   HGB 13.6 12.2*   HCT 40.1 35.7*   MCV 84.2 85.0   MCH 28.6 29.0   MCHC 33.9 34.2   RDW 13.7 13.6    282   MPV 8.8 9.3     Recent Labs     05/12/21  0402 05/13/21  0439    135   K 4.1 4.5    102   CO2 18* 23   BUN 25* 12   CREATININE 1.9* 0.6*   GLUCOSE 117* 167*   PROT 7.4 6.5   LABALBU 4.5 3.8   CALCIUM 8.8 8.8   BILITOT 0.3 0.5   ALKPHOS 95 69   AST 75* 41*   ALT 35 27     No results found for: CRP  Lab Results   Component Value Date    SEDRATE 15 05/12/2021     Recent Labs     05/12/21  0402 05/12/21  1153 05/13/21  0439   PROCAL  --  0.55*  --    INR  --  1.0  --    PROTIME  --  11.8  --    AST 75*  --  41*   ALT 35  --  27     No results found for: CHOL, TRIG, HDL, LDLCALC, LABVLDL  No results found for: VITD25    Microbiology:   Recent Labs     05/12/21  1000   COVID19 Not Detected     Lab Results   Component Value Date    BC 24 Hours no growth 05/12/2021    BLOODCULT2 24 Hours no growth 05/12/2021      FINAL IMPRESSION    Pt came in with s/p fall had abd pain hematuria   Diagnostic laparoscopy with robotic assisted had abdominal exploration and washout; primarily repair of bladder rupture by urology  mishel better  -cx pending   afebrile wbc normal   Cont atbx for now        metronidazole (FLAGYL) 500 mg in NaCl 100 mL IVPB premix, Q8H  cefTRIAXone (ROCEPHIN) 2000 mg in 50 mL IVPB (premix), Q24H           · Monitor labs    Imaging and labs were reviewed per medical records. Thank you for involving me in the care of [facility] I will continue to follow. Please do not hesitate to call for any questions or concerns.     Electronically signed by Kp Zambrano MD on 5/13/2021 at 11:36 AM

## 2021-05-13 NOTE — PROGRESS NOTES
General Surgery Progress Note  Arlene Tobias MD, MS    Patient's Name/Date of Birth: Elly Grimes / 1/1/1887    Date: May 13, 2021     Surgeon: Vinicius Padilla MD    Chief Complaint: bladder rupture    Patient Active Problem List   Diagnosis    Abdominal pain    NICOLAS (acute kidney injury) ([facility] [address]    Free fluid in pelvis    Ascites    Hematuria    Urinary tract infection with hematuria       Subjective: doing well, pain controlled, no nausea. Again attempted to obtain  services however there is no ability to translate the dialect the patient prefers. I was able to communicate adequately that he is improving and anxious for discharge.     Objective:  /70   Pulse 71   Temp 98.1 °F (36.7 °C) (Oral)   Resp 16   Ht 5' 6\" (1.676 m)   Wt 150 lb (68 kg)   SpO2 99%   BMI 24.21 kg/m²   Labs:  Recent Labs     05/12/21  0402 05/13/21  0439   WBC 8.1 5.8   HGB 13.6 12.2*   HCT 40.1 35.7*     Lab Results   Component Value Date    CREATININE 0.6 (L) 05/13/2021    BUN 12 05/13/2021     05/13/2021    K 4.5 05/13/2021     05/13/2021    CO2 23 05/13/2021     Recent Labs     05/12/21  0402   LIPASE 24         General appearance:  NAD  Head: NCAT, PERRLA, EOMI, red conjunctiva  Neck: supple, no masses  Lungs: CTAB, equal chest rise bilateral  Heart: Reg rate  Abdomen: soft, nondistended, tender appropriately, incision C/D/I, drain output serosanguinous, dressing dry  Skin; no lesions  Gu: no cva tenderness  Extremities: extremities normal, atraumatic, no cyanosis or edema      Assessment/Plan:  Elly Grimes is a 30yo y.o. male POD 1 lap robot repair bladder rupture secondary to fall    Gen diet  Bowel regimen  Dave per urology  PT/OT  DC planning  Ok to DC when ok with other services    Physician Signature: Electronically signed by Dr. Vinicius Padilla  [phone] (p)  5/13/2021  10:07 AM

## 2021-05-14 LAB
ALBUMIN SERPL-MCNC: 3.1 G/DL (ref 3.5–5.2)
ALP BLD-CCNC: 59 U/L (ref 40–129)
ALT SERPL-CCNC: 22 U/L (ref 0–40)
ANION GAP SERPL CALCULATED.3IONS-SCNC: 6 MMOL/L (ref 7–16)
AST SERPL-CCNC: 34 U/L (ref 0–39)
BASOPHILS ABSOLUTE: 0.01 E9/L (ref 0–0.2)
BASOPHILS RELATIVE PERCENT: 0.2 % (ref 0–2)
BILIRUB SERPL-MCNC: 0.3 MG/DL (ref 0–1.2)
BILIRUBIN DIRECT: <0.2 MG/DL (ref 0–0.3)
BILIRUBIN, INDIRECT: ABNORMAL MG/DL (ref 0–1)
BUN BLDV-MCNC: 16 MG/DL (ref 6–20)
CALCIUM SERPL-MCNC: 8.2 MG/DL (ref 8.6–10.2)
CHLORIDE BLD-SCNC: 105 MMOL/L (ref 98–107)
CO2: 27 MMOL/L (ref 22–29)
CREAT SERPL-MCNC: 0.7 MG/DL (ref 0.7–1.2)
CREATININE FLUID: 0.6 MG/DL
EOSINOPHILS ABSOLUTE: 0.01 E9/L (ref 0.05–0.5)
EOSINOPHILS RELATIVE PERCENT: 0.2 % (ref 0–6)
FLUID TYPE: NORMAL
GFR AFRICAN AMERICAN: >60
GFR NON-AFRICAN AMERICAN: >60 ML/MIN/1.73
GLUCOSE BLD-MCNC: 101 MG/DL (ref 74–99)
HAV IGM SER IA-ACNC: NORMAL
HCT VFR BLD CALC: 33 % (ref 37–54)
HEMOGLOBIN: 11.1 G/DL (ref 12.5–16.5)
HEPATITIS B CORE IGM ANTIBODY: NORMAL
HEPATITIS B SURFACE ANTIGEN INTERPRETATION: NORMAL
HEPATITIS C ANTIBODY INTERPRETATION: NORMAL
HIV-1 AND HIV-2 ANTIBODIES: NORMAL
IMMATURE GRANULOCYTES #: 0.02 E9/L
IMMATURE GRANULOCYTES %: 0.4 % (ref 0–5)
LYMPHOCYTES ABSOLUTE: 1.61 E9/L (ref 1.5–4)
LYMPHOCYTES RELATIVE PERCENT: 28.4 % (ref 20–42)
MCH RBC QN AUTO: 28.8 PG (ref 26–35)
MCHC RBC AUTO-ENTMCNC: 33.6 % (ref 32–34.5)
MCV RBC AUTO: 85.7 FL (ref 80–99.9)
MONOCYTES ABSOLUTE: 0.47 E9/L (ref 0.1–0.95)
MONOCYTES RELATIVE PERCENT: 8.3 % (ref 2–12)
NEUTROPHILS ABSOLUTE: 3.54 E9/L (ref 1.8–7.3)
NEUTROPHILS RELATIVE PERCENT: 62.5 % (ref 43–80)
PDW BLD-RTO: 13.5 FL (ref 11.5–15)
PLATELET # BLD: 255 E9/L (ref 130–450)
PMV BLD AUTO: 8.9 FL (ref 7–12)
POTASSIUM REFLEX MAGNESIUM: 3.7 MMOL/L (ref 3.5–5)
RBC # BLD: 3.85 E12/L (ref 3.8–5.8)
SODIUM BLD-SCNC: 138 MMOL/L (ref 132–146)
TOTAL PROTEIN: 5.9 G/DL (ref 6.4–8.3)
URINE CULTURE, ROUTINE: NORMAL
WBC # BLD: 5.7 E9/L (ref 4.5–11.5)

## 2021-05-14 PROCEDURE — 85025 COMPLETE CBC W/AUTO DIFF WBC: CPT

## 2021-05-14 PROCEDURE — 6360000002 HC RX W HCPCS: Performed by: SURGERY

## 2021-05-14 PROCEDURE — 96375 TX/PRO/DX INJ NEW DRUG ADDON: CPT

## 2021-05-14 PROCEDURE — 36415 COLL VENOUS BLD VENIPUNCTURE: CPT

## 2021-05-14 PROCEDURE — 80076 HEPATIC FUNCTION PANEL: CPT

## 2021-05-14 PROCEDURE — 2580000003 HC RX 258: Performed by: SURGERY

## 2021-05-14 PROCEDURE — 6370000000 HC RX 637 (ALT 250 FOR IP): Performed by: CLINICAL NURSE SPECIALIST

## 2021-05-14 PROCEDURE — 82570 ASSAY OF URINE CREATININE: CPT

## 2021-05-14 PROCEDURE — 2500000003 HC RX 250 WO HCPCS: Performed by: SURGERY

## 2021-05-14 PROCEDURE — 97165 OT EVAL LOW COMPLEX 30 MIN: CPT

## 2021-05-14 PROCEDURE — G0378 HOSPITAL OBSERVATION PER HR: HCPCS

## 2021-05-14 PROCEDURE — 6370000000 HC RX 637 (ALT 250 FOR IP): Performed by: SURGERY

## 2021-05-14 PROCEDURE — 97161 PT EVAL LOW COMPLEX 20 MIN: CPT

## 2021-05-14 PROCEDURE — 80048 BASIC METABOLIC PNL TOTAL CA: CPT

## 2021-05-14 PROCEDURE — C9113 INJ PANTOPRAZOLE SODIUM, VIA: HCPCS | Performed by: SURGERY

## 2021-05-14 PROCEDURE — 99225 PR SBSQ OBSERVATION CARE/DAY 25 MINUTES: CPT | Performed by: INTERNAL MEDICINE

## 2021-05-14 RX ORDER — CEFDINIR 300 MG/1
300 CAPSULE ORAL EVERY 12 HOURS SCHEDULED
Qty: 14 CAPSULE | Refills: 0 | Status: SHIPPED | OUTPATIENT
Start: 2021-05-14 | End: 2021-05-21

## 2021-05-14 RX ORDER — CEFDINIR 300 MG/1
300 CAPSULE ORAL EVERY 12 HOURS SCHEDULED
Status: DISCONTINUED | OUTPATIENT
Start: 2021-05-14 | End: 2021-05-15 | Stop reason: HOSPADM

## 2021-05-14 RX ADMIN — OXYCODONE HYDROCHLORIDE AND ACETAMINOPHEN 1 TABLET: 5; 325 TABLET ORAL at 21:03

## 2021-05-14 RX ADMIN — CEFDINIR 300 MG: 300 CAPSULE ORAL at 20:57

## 2021-05-14 RX ADMIN — CEFTRIAXONE 2000 MG: 2 INJECTION, SOLUTION INTRAVENOUS at 14:37

## 2021-05-14 RX ADMIN — SODIUM CHLORIDE, PRESERVATIVE FREE 10 ML: 5 INJECTION INTRAVENOUS at 20:58

## 2021-05-14 RX ADMIN — PANTOPRAZOLE SODIUM 40 MG: 40 INJECTION, POWDER, FOR SOLUTION INTRAVENOUS at 09:28

## 2021-05-14 RX ADMIN — OXYCODONE HYDROCHLORIDE AND ACETAMINOPHEN 1 TABLET: 5; 325 TABLET ORAL at 05:09

## 2021-05-14 RX ADMIN — SODIUM CHLORIDE, PRESERVATIVE FREE 10 ML: 5 INJECTION INTRAVENOUS at 09:28

## 2021-05-14 RX ADMIN — METRONIDAZOLE 500 MG: 500 INJECTION, SOLUTION INTRAVENOUS at 04:52

## 2021-05-14 ASSESSMENT — PAIN DESCRIPTION - PAIN TYPE: TYPE: SURGICAL PAIN

## 2021-05-14 ASSESSMENT — PAIN SCALES - GENERAL: PAINLEVEL_OUTOF10: 6

## 2021-05-14 ASSESSMENT — PAIN DESCRIPTION - FREQUENCY: FREQUENCY: INTERMITTENT

## 2021-05-14 NOTE — PROGRESS NOTES
General Surgery Progress Note  Wilmer Malcolm MD, MS    Patient's Name/Date of Birth: Bill Landers / 1/1/1887    Date: May 14, 2021     Surgeon: Rebecca Paget, MD    Chief Complaint: bladder rupture    Patient Active Problem List   Diagnosis    Abdominal pain    NICOLAS (acute kidney injury) ([address]    Free fluid in pelvis    Ascites    Hematuria    Urinary tract infection with hematuria    Intraperitoneal rupture of bladder       Subjective: doing well, pain controlled, still tremendous barrier to communication, attempted again with  however I cannot conclude whether this is developmental delay or language barrier. He states he is feeling well and he says he does not want to go back to [place] until he is done working.   He is sitting very comfortable in the room eating salad    Objective:  /65   Pulse 73   Temp 97.9 °F (36.6 °C) (Oral)   Resp 18   Ht 5' 6\" (1.676 m)   Wt 150 lb (68 kg)   SpO2 95%   BMI 24.21 kg/m²   Labs:  Recent Labs     05/12/21  0402 05/13/21  0439 05/14/21  0724   WBC 8.1 5.8 5.7   HGB 13.6 12.2* 11.1*   HCT 40.1 35.7* 33.0*     Lab Results   Component Value Date    CREATININE 0.7 05/14/2021    BUN 16 05/14/2021     05/14/2021    K 3.7 05/14/2021     05/14/2021    CO2 27 05/14/2021     Recent Labs     05/12/21  0402   LIPASE 24         General appearance:  NAD  Head: NCAT, PERRLA, EOMI, red conjunctiva  Neck: supple, no masses  Lungs: CTAB, equal chest rise bilateral  Heart: Reg rate  Abdomen: soft, nondistended, tender appropriately, incision C/D/I, drain output serous, dressing dry  Skin; no lesions  Gu: no cva tenderness, greenwood clear yellow urine  Extremities: extremities normal, atraumatic, no cyanosis or edema      Assessment/Plan:  Bill Landers is a 30yo y.o. male POD 2 lap robot repair bladder rupture secondary to fall    Okay for discharge when okay with others  Social work to assist with placement

## 2021-05-14 NOTE — PROGRESS NOTES
[facility]  [facility]  Progress Note    NAME: Gloria Escamilla  MR:  [id number]  :   1887  DATE OF SERVICE:21    This is a face to face encounter with Gloria Escamilla 81 y.o. male on 21    CHIEF COMPLAINT     ID following for   Chief Complaint   Patient presents with    Fall    Abdominal Pain     HISTORY OF PRESENT ILLNESS   S/p Diagnostic laparoscopy with robotic assisted had abdominal exploration and washout; primarily repair of bladder rupture by urology    Has drain- JONATHON   Up in chair   Dave yellow   Limited communication- does not speak english   Has been afebrile     Patient is tolerating medications. No reported adverse drug reactions. REVIEW OF SYSTEMS     As stated above in the chief complaint, otherwise negative. CURRENT MEDICATIONS      sodium chloride flush  10 mL Intravenous 2 times per day    metroNIDAZOLE  500 mg Intravenous Q8H    pantoprazole  40 mg Intravenous Daily    And    sodium chloride (PF)  10 mL Intravenous Daily    [Held by provider] heparin (porcine)  5,000 Units Subcutaneous 3 times per day    cefTRIAXone  2,000 mg Intravenous Q24H     Continuous Infusions:   lactated ringers 75 mL/hr at 21 1415    sodium chloride       PRN Meds:sodium chloride flush, sodium chloride, morphine **OR** morphine, ondansetron **OR** ondansetron, oxyCODONE-acetaminophen, opium-belladonna    PHYSICAL EXAM     /65   Pulse 73   Temp 97.9 °F (36.6 °C) (Oral)   Resp 18   Ht 5' 6\" (1.676 m)   Wt 150 lb (68 kg)   SpO2 98%   BMI 24.21 kg/m²   Temp  Av.4 °F (36.9 °C)  Min: 97.8 °F (36.6 °C)  Max: 98.9 °F (37.2 °C)  Constitutional:  The patient is awake, alert, and oriented. Sitting up in chair. Skin:    Warm and dry. No rashes were noted. HEENT:   Round and reactive pupils. AT/NC  Neck:    Supple to movements. Chest:   No use of accessory muscles to breathe. Symmetrical expansion.    Cardiovascular:  S1 and S2 are rhythmic and regular. No murmurs appreciated. Abdomen:   Positive bowel sounds to auscultation. Slightly tender with palpation. drain serosang. Extremities:   No clubbing, no cyanosis, no edema.   CNS    AAxO   Lines: piv  Dave - yellow     DIAGNOSTIC RESULTS   Radiology:    Recent Labs     05/12/21  0402 05/13/21  0439 05/14/21  0724   WBC 8.1 5.8 5.7   RBC 4.76 4.20 3.85   HGB 13.6 12.2* 11.1*   HCT 40.1 35.7* 33.0*   MCV 84.2 85.0 85.7   MCH 28.6 29.0 28.8   MCHC 33.9 34.2 33.6   RDW 13.7 13.6 13.5    282 255   MPV 8.8 9.3 8.9     Recent Labs     05/12/21  0402 05/13/21  0439 05/14/21  0724    135 138   K 4.1 4.5 3.7    102 105   CO2 18* 23 27   BUN 25* 12 16   CREATININE 1.9* 0.6* 0.7   GLUCOSE 117* 167* 101*   PROT 7.4 6.5 5.9*   LABALBU 4.5 3.8 3.1*   CALCIUM 8.8 8.8 8.2*   BILITOT 0.3 0.5 0.3   ALKPHOS 95 69 59   AST 75* 41* 34   ALT 35 27 22     No results found for: CRP  Lab Results   Component Value Date    SEDRATE 15 05/12/2021     Recent Labs     05/12/21  0402 05/12/21  1153 05/13/21  0439 05/14/21  0724   PROCAL  --  0.55*  --   --    INR  --  1.0  --   --    PROTIME  --  11.8  --   --    AST 75*  --  41* 34   ALT 35  --  27 22     No results found for: CHOL, TRIG, HDL, LDLCALC, LABVLDL  No results found for: VITD25    Microbiology:   Recent Labs     05/12/21  1000   COVID19 Not Detected     Lab Results   Component Value Date    BC 24 Hours no growth 05/12/2021    BLOODCULT2 24 Hours no growth 05/12/2021      FINAL IMPRESSION    Pt came in with s/p fall had abd pain hematuria   Diagnostic laparoscopy with robotic assisted had abdominal exploration and washout; primarily repair of bladder rupture by urology  mishel better       Plan:   · Stop flagyl   · cefTRIAXone (ROCEPHIN) 2000 mg in 50 mL IVPB (premix), Q 24H- for now- can change to [facility] for discharge   · Cultures reviewed- no growth to date    · Monitor labs  · Cultures reviewed   · Urology following- for cystogram as an outpt     Imaging and labs were reviewed per medical records. Thank you for involving me in the care of [facility] I will continue to follow. Please do not hesitate to call for any questions or concerns. Electronically signed by CASTRO Hernandez on 5/14/2021 at 10:40 AM       As above    This is a face to face encounter with [facility] on 05/14/21. I have performed and participated in the history, exam, medical decision making, and  POC with the NURSE PRACTITIONER. Imaging and labs were reviewed. [facility] was informed of their diagnosis, indications, risks and benefits of treatment. [facility] had the opportunity to ask questions. All questions were answered. Son here with   He is feeling better  He wants a little pain mx  Dave in yellow clear  inform them they will oral atbx for a week  Pt can be d/c   Can f/u with prn    Thank you for involving me in the care of [facility]. Please do not hesitate to call for any questions or concerns.     Electronically signed by Mikie Britt MD on 5/14/2021 at 5:38 PM    Phone [phone]  Fax [phone]

## 2021-05-14 NOTE — PROGRESS NOTES
[address] [place], [place] [address]ist   Progress Note    Admitting Date and Time: 5/12/2021  3:27 AM  Admit Dx: Abdominal pain [R10.9]    Subjective/interval history:    5/13: Pt had emergency laparoscopy and repair of urinary bladder perforation last night after CT cystogram with contrast confirmed diagnosis. 5/14: Patient appears comfortable. Discussed with Dr. Mee Covarrubias on the floor that his surgical drain will be removed prior to discharge, but we will need to make sure that we can facilitate follow-up. ROS: Unable to obtain reliable review of systems due to language barrier     sodium chloride flush  10 mL Intravenous 2 times per day    pantoprazole  40 mg Intravenous Daily    And    sodium chloride (PF)  10 mL Intravenous Daily    [Held by provider] heparin (porcine)  5,000 Units Subcutaneous 3 times per day    cefTRIAXone  2,000 mg Intravenous Q24H     sodium chloride flush, 10 mL, PRN  sodium chloride, 25 mL, PRN  morphine, 2 mg, Q4H PRN    Or  morphine, 4 mg, Q4H PRN  ondansetron, 4 mg, Q8H PRN    Or  ondansetron, 4 mg, Q6H PRN  oxyCODONE-acetaminophen, 1 tablet, Q6H PRN  opium-belladonna, 60 mg, Q8H PRN         Objective:    /65   Pulse 73   Temp 97.9 °F (36.6 °C) (Oral)   Resp 18   Ht 5' 6\" (1.676 m)   Wt 150 lb (68 kg)   SpO2 95%   BMI 24.21 kg/m²   General Appearance: Awake, alert, does not appear to be in any distress. Skin: warm and dry  Head: normocephalic and atraumatic  Eyes: pupils equal, round, and reactive to light, extraocular eye movements intact, conjunctivae normal  Neck: neck supple and non tender without mass   Pulmonary/Chest: clear to auscultation bilaterally- no wheezes, rales or rhonchi, normal air movement, no respiratory distress  Cardiovascular: normal rate, normal S1 and S2 and no carotid bruits  Abdomen:  Laparoscopic incisions clean, dry, intact. Drain in place. Normal bowel sounds. Abdomen appears tender on palpation without guarding. Extremities: no cyanosis, no clubbing and no edema  Neurologic: Cranial nerves II through XII appear to be grossly intact. Moves all extremities equally. Recent Labs     05/12/21 0402 05/13/21 0439 05/14/21  0724    135 138   K 4.1 4.5 3.7    102 105   CO2 18* 23 27   BUN 25* 12 16   CREATININE 1.9* 0.6* 0.7   GLUCOSE 117* 167* 101*   CALCIUM 8.8 8.8 8.2*       Recent Labs     05/12/21 0402 05/13/21 0439 05/14/21  0724   ALKPHOS 95 69 59   PROT 7.4 6.5 5.9*   LABALBU 4.5 3.8 3.1*   BILITOT 0.3 0.5 0.3   AST 75* 41* 34   ALT 35 27 22       Recent Labs     05/12/21 0402 05/13/21 0439 05/14/21  0724   WBC 8.1 5.8 5.7   RBC 4.76 4.20 3.85   HGB 13.6 12.2* 11.1*   HCT 40.1 35.7* 33.0*   MCV 84.2 85.0 85.7   MCH 28.6 29.0 28.8   MCHC 33.9 34.2 33.6   RDW 13.7 13.6 13.5    282 255   MPV 8.8 9.3 8.9       Radiology:   CT CYSTOGRAM W CONTRAST   Final Result   Extensive extravasation of administered contrast via urinary bladder catheter   consistent with urinary bladder rupture-perforation. Punctate foci of   extraluminal air are also seen in the pelvis. Findings reported to Dr. Sona Montez at 8:54 p.m. XR WRIST RIGHT (MIN 3 VIEWS)   Final Result   Soft tissue swelling without obvious fracture. MRI or CT would be useful if   symptoms persist.         US ABDOMEN LIMITED   Final Result   Limited sonographic evaluation of the abdomen and pelvis revealed no ascites. As such, paracentesis was not performed. CT HEAD WO CONTRAST   Final Result   No acute intracranial abnormality. CT CERVICAL SPINE WO CONTRAST   Final Result   No acute abnormality of the cervical spine. MRI would be useful if symptoms   persist.         CT ABDOMEN PELVIS WO CONTRAST Additional Contrast? None   Final Result   Moderate amount of low-attenuation free fluid throughout the abdomen and   pelvis of uncertain etiology.       Patchy areas of atelectasis versus developing infiltrates involving the right   greater than left lung base. Mild urinary bladder wall thickening. This may be related to   underdistention. Cystitis not excluded. Unusual curvilinear densities immediately superior to the urinary bladder. While these could represent volume averaging with small bowel loops, they are   difficult to definitively connect to bowel and therefore other etiologies   including ill-defined hemorrhage or hematoma not entirely excluded and should   be correlated with the patient's overall clinical presentation. Short-term   follow-up imaging may be useful if indicated. Assessment/Plan:  Principal Problem:    Intraperitoneal rupture of bladder  Active Problems:    Abdominal pain    NICOLAS (acute kidney injury) ([address]    Free fluid in pelvis    Ascites    Hematuria    Urinary tract infection with hematuria  Resolved Problems:    * No resolved hospital problems. *      1. Bladder rupture  -Status post laparoscopic repair postoperative day #2  -Postoperative care management per general surgery and urology     2. Urinary tract infection with hematuria  -On ceftriaxone per infectious disease  -Urine and blood culture continue to show no growth to date     3. Acute kidney injury  -Resolved with IV fluid hydration and repair of bladder up    NOTE: This report was transcribed using voice recognition software. Every effort was made to ensure accuracy; however, inadvertent computerized transcription errors may be present.      Electronically signed by Celanese CorporationDO on 5/14/2021 at 12:06 PM

## 2021-05-14 NOTE — PROGRESS NOTES
OCCUPATIONAL THERAPY  Initial Evaluation  Date:2021  Patient Name: Shoshana Tejeda  MRN: [id number]  : 1887  ROOM #: 0315/0315-02     Referring Provider: Bisi Malcolm MD  Evaluating OT: Olean Bosworth OTR/L [id number]    Placement Recommendation: HHOT     Recommended Adaptive Equipment: cane        AMPAC   AM-PAC Daily Activity Inpatient   How much help for putting on and taking off regular lower body clothing?: A Lot  How much help for Bathing?: A Little  How much help for Toileting?: A Little  How much help for putting on and taking off regular upper body clothing?: A Little  How much help for taking care of personal grooming?: A Little  How much help for eating meals?: None  AM-PAC Inpatient Daily Activity Raw Score: 18  AM-PAC Inpatient ADL T-Scale Score : 38.66  ADL Inpatient CMS 0-100% Score: 46.65  ADL Inpatient CMS G-Code Modifier : CK    Based on patient's functional performance as stated below and their level of assistance needed prior to admission, this therapist believes that the patient would benefit from skilled Occupational Therapy following their hospital stay in an effort to increase safety and independence with completion of ADL/IADL tasks for functional independence and quality of life. Diagnosis:   1. Generalized abdominal pain    2. Laceration of scalp, initial encounter      Pertinent Medical History:   History reviewed. No pertinent past medical history. Precautions:  falls, Mexican speaking, POD#2 robotic assisted laparoscopic cystorrhaphy, pelvic drain (JONATHON) placement   Intraperitoneal bladder rupture  Comment:  device in room but patient did not appear to understand the dialect. Pain Scale: Numeric Rate: facial grimace; Nursing notified. Social history and home set up: unknown      PLOF: independent with BADL and independent with IADL, pt ambulated with no device   Equipment owned: none   Cognition: A&O x 2; follows 1 step directions.     fair  Problem solving skills   fair  Memory    fair  Sequencing   fair  Judgement/safety  Communication: intact   Visual perceptual skills: intact     Glasses: no   Edema: no     Sensation: intact   Hand Dominance:  Left     X Right     Left Right Comment   Passive range of motion [facility]     Active range of motion [facility]     Muscle Grade 4/5 4/5    /pinch Strength Intact  Intact       Functional Assessment:   Initial Evaluation Status Date:   5/14/2021 Treatment Status  Date: STGs = LTGs  Time frame: 5 - 14 days   Feeding Independent   Independent    Grooming Minimal Assist   Independent    UB Dressing Minimal Assist   Independent    LB Dressing Maximal Assist to don socks   Independent    Bathing Minimal Assist  Independent    Toileting Supervision   Independent    Bed Mobility  Facilitated Supine to sit: Supervision   Scooting:Supervision  Sit to supine: N/T     Rolling: Supervision  Supine to sit: Independent   Scooting:Independent  Sit to supine: Independent   Rolling: Independent   Functional Transfers Supervision from EOB x 2 reps. Supervision for transfer to and from commode with minimal verbal and tactile cues to use grab bar for safe commode transfer. Transfer training with verbal cues for hand placement throughout session to improve safety. Independent    Functional Mobility Minimal assist with hand held assist to and from bathroom and household distances. Unsteady. One loss of balance with minimal assist to correct. Independent    Activity Tolerance fair  good     Balance:   Sitting balance at EOB and commode to increase dynamic sitting balance and activities tolerance with Supervision     Standing fair with hand held assist to improve balance, unsteady   Endurance: fair     Comments: Upon arrival to the room the patient was supine. At end of the session, the patient was left in care of PT to start their evaluation. Call light and phone within reach.  Pt required verbal cues and instruction as noted above for safe facilitation and completion of tasks. Therapist provided skilled monitoring of the patient's response during treatment session. Prior to and at the end of session, environmental modifications/line management completed for patients safety and efficiency of treatment session. OT services provided to include instruction/training on safety and adapted techniques for completion of transfers and ADL's. Overall, the patient demonstrates difficulties with completion of BADL's and IADL's. Factors contributing to these difficulties includes decreased endurance and generalized weakness. Pt would benefit from continued skilled OT to increase independence with BADL's and IADL's. Treatment:    Bed mobility: Facilitated bed mobility with cues for proper body mechanics and sequencing to prepare for ADL completion. Functional transfers: Facilitated transfers from various surfaces with cues for body alignment, safety and hand placement. ADL completion: Self-care retraining for the above-mentioned ADLs; training on proper hand placement, safety technique, sequencing, and energy conservation techniques. Postural Balance: Sitting and standing balance retraining to improve righting reactions with postural changes during ADLs. Skilled positioning: Proper positioning to improve interaction with environment, overall functioning and decrease/prevent edema and contractures. Evaluation Complexity:   · Low Complexity  · History: Brief history including review of medical records relating to the problem  · Exam: 1-3 performance Deficits  · Assistance/Modification: No assistance or modifications required to perform tasks. No comorbities affecting occupational performance.     Assessment of current deficits:   Functional mobility [x]        ADLs [x]        Strength [x]      Cognition []  Functional transfers  [x]       IADLs [x]        Safety Awareness []      Endurance [x]  Fine Motor Coordination []       Balance [x] Vision/perception []     Sensation []   Gross Motor Coordination []       ROM []         Delirium []                          Motor Control []    Plan of Care: OT 1-3x/week for 5-7 days PRN   Instruction/training on adapted ADL techniques and AE recommendations to increased functional independence with precautions   Functional transfer/mobility training/DME recommendations for increased independence, safety, and fall prevention    Therapeutic activity to facilitate/challenge dynamic balance, standing tolerance, fine motor dexterity/in-hand manipulation for increased independence with ALD's    Functional Mobility Training   Training on energy conservation techniques/strategies to improve independence/tolerance for self care routine   Positioning to Improve Functional [place], Safety, and Skin Integrity   Patient/family education to increase follow through with safety techniques and functional independence   Therapeutic exercise to improve motor endurance, ROM, and functional strength for ALD's and functional transfers   Splinting/positioning for increased function, prevention of contractures, and improved skin integrity   Recommendation of environmental modifications for increased safety with functional transfers/mobility and ADL's          Rehab Potential: Good for established goals developed with patient and family. Patient / Family Goal: no goal stated      Patient and/or family were instructed on functional diagnosis, prognosis/goals and OT plan of care. Demonstrated fair understanding. Evaluation time includes thorough review of current medical information, gathering information on past medical history/social history and prior level of function, completion of standardized testing/informal observation of tasks, assessment of data, and development of POC/Goals.     Time In: 8:30am    Time Out: 8:50am                  Min Units   OT Eval Low 60242 X     OT Eval Medium 47045     OT Eval High [id number]     OT Re-Eval [id number]          ADL/Self Care 70836     Therapeutic Activities 11257     Therapeutic Ex 15630     Orthotic Management 98461     Neuro Re-Ed 53173     Non-Billable Time     TOTAL TIMED TREATMENT 0 0     Marleni Fu OTR/L [id number]

## 2021-05-14 NOTE — PROGRESS NOTES
Pts son and friend who are able to translate with patient present at bedside. Dr. Power Search also present. opportumity given for questions and also explanation of drain removal. Pt denied pain. Family stated that they will be available Saturday morning for teaching the greenwood care for at home. I did explain to family about leg bag and night bag but explained they will receive further instructions at discharge. Per friend there were no further questions.

## 2021-05-14 NOTE — PROGRESS NOTES
Physical Therapy Initial Evaluation    Room #:  0315/0315-02  Patient Name: Shashank Chicas  YOB: 1887  MRN: [id number]    Referring Provider:   Verona Pedersen MD     Date of Service: 5/14/2021    Evaluating Physical Therapist: Carmen Martini, PT #[id number]      Diagnosis:   Abdominal pain [R10.9]       Patient Active Problem List   Diagnosis    Abdominal pain    NICOLAS (acute kidney injury) ([address]    Free fluid in pelvis    Ascites    Hematuria    Urinary tract infection with hematuria    Intraperitoneal rupture of bladder       Tentative placement recommendation: Home Health Physical Therapy   Equipment recommendation: Cane      Prior Level of Function: Patient ambulated independently   Rehab Potential: good  for baseline    Past medical history:   History reviewed. No pertinent past medical history. Past Surgical History:   Procedure Laterality Date    HERNIA REPAIR N/A 5/12/2021    ROBOTIC EXPLORATION OF ABDOMEN WITH REPAIR OF BLADDER RUPTURE performed by Michelle Huang MD at [facility]       Precautions: , falls and alarm , greenwood, Belarusian speaking, JONATHON drain    SUBJECTIVE:    Social history: Unable to gather social history  Equipment owned: None,      [address] [place] [address],[address] Mobility Inpatient   How much difficulty turning over in bed?: None  How much difficulty sitting down on / standing up from a chair with arms?: None  How much difficulty moving from lying on back to sitting on side of bed?: A Little  How much help from another person moving to and from a bed to a chair?: A Little  How much help from another person needed to walk in hospital room?: A Little  How much help from another person for climbing 3-5 steps with a railing?: A Little  AM-PAC Inpatient Mobility Raw Score : 20  AM-PAC Inpatient T-Scale Score : 47.67  Mobility Inpatient CMS 0-100% Score: 35.83  Mobility Inpatient CMS G-Code Modifier : [address] cleared patient for PT evaluation.  The admitting diagnosis and active problem list as listed above have been reviewed prior to the initiation of this evaluation. OBJECTIVE;   Initial Evaluation  Date: 5/14/2021 Treatment Date:     Short Term/ Long Term   Goals   Was pt agreeable to Eval/treatment? Yes   To be met in 3 days   Pain level   unable to rate     Bed Mobility    Rolling: Not assessed    Supine to sit: Not assessed    Sit to supine: Not assessed    Scooting: Supervision    Rolling: Independent   Supine to sit: Independent   Sit to supine: Independent   Scooting: Independent    Transfers Sit to stand: Supervision    Sit to stand: Independent    Ambulation    2x20 feet using  hand held assist with Minimal assist of 1   unsteady on feet   75 feet using  no device with Independent    Stair negotiation: ascended and descended   Not assessed       ROM Within functional limits      Strength BUE:  refer to OT eval  RLE:  4/5  LLE:  4/5  Increase strength in affected mm groups by 1/3 grade   Balance Sitting EOB:  good   Dynamic Standing:  fair  Sitting EOB:  good   Dynamic Standing: good      Patient is Alert & Oriented x person, place, time and situation and follows directions   Sensation:  Patient  denies numbness and tingling     Edema:  no  Endurance: fair  +    Vitals: room air   Blood Pressure at rest  Blood Pressure during session    Heart Rate at rest  Heart Rate during session   SPO2 at rest %  SPO2 during session %     Patient education  Patient educated on role of Physical Therapy, risks of immobility, safety and plan of care,  importance of mobility while in hospital , ankle pumps, quad set and glut set for edema control, blood clot prevention and safety      Patient response to education:   Pt verbalized understanding Pt demonstrated skill Pt requires further education in this area   Yes Partial Yes      Treatment:  Patient practiced and was instructed/facilitated in the following treatment: Patient in bathroom with OT at start of session. Patient stood and amb in hallway and back to EOB. Sat edge of bed 10 minutes with Supervision  to increase dynamic sitting balance and activity tolerance. Patient performed exercises and donned pants. Stood and sat down in chair by bedside. Therapeutic Exercises:  ankle pumps, long arc quad and seated marching  x 10 reps. At end of session, patient in chair with . call light and phone within reach,  all lines and tubes intact, nursing notified. Patient would benefit from continued skilled Physical Therapy to improve functional independence and quality of life. Patient's/ family goals   none stated      ASSESSMENT: Patient exhibits decreased strength and balance impairing gait distance. Patient presents with unsteadiness on feet, requiring moderate assist to maintain upright balance with no device. Recommend going home with cane to increase safety and decrease risk of fall. Plan of Care:     -Standing Balance: Perform strengthening exercises in standing to promote motor control with or without upper extremity support   -Transfers: Provide instruction on proper hand and foot position for adequate transfer of weight onto lower extremities and use of gait device  -Gait: Gait training and Standing activities to improve: base of support, weight shift, weight bearing    -Endurance: Utilize Supervised activities to increase level of endurance to allow for safe functional mobility including transfers and gait   -Stairs: Stair training with instruction on proper technique and hand placement on rail    Patient and or family understand(s) diagnosis, prognosis, and plan of care. Frequency of treatments: Patient will be seen  daily.        Time in  0850  Time out  0908    Total Treatment Time  0 minutes    Evaluation time includes thorough review of current medical information, gathering information on past medical history/social history and prior level of function, completion of standardized testing/informal observation of tasks, assessment of data, and development of Plan of care and goals.      CPT codes:  Low Complexity PT evaluation (76230)  No treatment    Aj Wahl, PT

## 2021-05-14 NOTE — PROGRESS NOTES
5/14/2021 8:33 AM  Service: Urology  Group: [facility] urology (Hood/Dawn/Kristy)    Saroj Long  [id number]    Subjective:    Still unable to communicate due to translation services unable to detect dialect  Attempted to use google translate but cannot read Bulgarian   No family present  Nursing reporting no overnight events  Tolerating diet  Greenwood draining yellow urine  JONATHON with serosanguinous fluid     Review of Systems  Unable to obtain due to no translation services to detect his dialect       Scheduled Meds:   sodium chloride flush  10 mL Intravenous 2 times per day    metroNIDAZOLE  500 mg Intravenous Q8H    pantoprazole  40 mg Intravenous Daily    And    sodium chloride (PF)  10 mL Intravenous Daily    [Held by provider] heparin (porcine)  5,000 Units Subcutaneous 3 times per day    cefTRIAXone  2,000 mg Intravenous Q24H       Objective:  Vitals:    05/14/21 0432   BP: 120/65   Pulse: 73   Resp: 18   Temp: 97.9 °F (36.6 °C)   SpO2: 98%         Allergies: Patient has no known allergies.     General Appearance: alert and oriented to person, place and time and in no acute distress  Skin: no rash or erythema  Head: normocephalic and atraumatic  Pulmonary/Chest: normal air movement, no respiratory distress  Abdomen: soft, non-tender, non-distended  Genitourinary: greenwood draining yellow urine, JONATHON with serosanguinous fluid   Extremities: no cyanosis, clubbing or edema         Labs:     Recent Labs     05/14/21  0724      K 3.7      CO2 27   BUN 16   CREATININE 0.7   GLUCOSE 101*   CALCIUM 8.2*       Lab Results   Component Value Date    HGB 11.1 05/14/2021    HCT 33.0 05/14/2021         Assessment/Plan:  POD#2 robotic assisted laparoscopic cystorrhaphy, pelvic drain placement   Intraperitoneal bladder rupture      Hemoglobin stable  Creatinine stable  Cont the JONATHON  JONATHON creatinine to be sent today, if creatinine high will need to continue JONATHON upon discharge   Await JONATHON creatinine  Ok for discharge from  standpoint once JONATHON creatinine has resulted  Cont the greenwood upon discharge   Will arrange for cystogram as outpatient in approximately 2 weeks to determine greenwood removal   Will follow     CASTRO Davis - KAREN   [facility]  Urology      Agree with above  Seen and examined  Agree with the plan and treatment    [employer], DO

## 2021-05-14 NOTE — CARE COORDINATION
5/14/21: COVID NEGATIVE: SS Note: Discharge plan for pt to return home when medically stable, pt self pay, does not speak english, no home care/dme needs anticipated, pt may need taxi transport at discharge, voucher in soft chart. Nursing informed. Electronically signed by Jennett Ormond, LSW on 5/14/2021 at 3:22 PM

## 2021-05-15 VITALS
BODY MASS INDEX: 24.11 KG/M2 | DIASTOLIC BLOOD PRESSURE: 72 MMHG | SYSTOLIC BLOOD PRESSURE: 110 MMHG | OXYGEN SATURATION: 99 % | WEIGHT: 150 LBS | RESPIRATION RATE: 16 BRPM | HEIGHT: 66 IN | TEMPERATURE: 97.7 F | HEART RATE: 68 BPM

## 2021-05-15 LAB
ALBUMIN SERPL-MCNC: 3.1 G/DL (ref 3.5–5.2)
ALP BLD-CCNC: 62 U/L (ref 40–129)
ALT SERPL-CCNC: 22 U/L (ref 0–40)
ANION GAP SERPL CALCULATED.3IONS-SCNC: 7 MMOL/L (ref 7–16)
AST SERPL-CCNC: 31 U/L (ref 0–39)
BASOPHILS ABSOLUTE: 0.02 E9/L (ref 0–0.2)
BASOPHILS RELATIVE PERCENT: 0.4 % (ref 0–2)
BILIRUB SERPL-MCNC: 0.4 MG/DL (ref 0–1.2)
BILIRUBIN DIRECT: <0.2 MG/DL (ref 0–0.3)
BILIRUBIN, INDIRECT: ABNORMAL MG/DL (ref 0–1)
BUN BLDV-MCNC: 11 MG/DL (ref 6–20)
CALCIUM SERPL-MCNC: 8.7 MG/DL (ref 8.6–10.2)
CHLORIDE BLD-SCNC: 103 MMOL/L (ref 98–107)
CO2: 26 MMOL/L (ref 22–29)
CREAT SERPL-MCNC: 0.7 MG/DL (ref 0.7–1.2)
EOSINOPHILS ABSOLUTE: 0.19 E9/L (ref 0.05–0.5)
EOSINOPHILS RELATIVE PERCENT: 3.6 % (ref 0–6)
GFR AFRICAN AMERICAN: >60
GFR NON-AFRICAN AMERICAN: >60 ML/MIN/1.73
GLUCOSE BLD-MCNC: 95 MG/DL (ref 74–99)
HCT VFR BLD CALC: 35.4 % (ref 37–54)
HEMOGLOBIN: 11.8 G/DL (ref 12.5–16.5)
IMMATURE GRANULOCYTES #: 0 E9/L
IMMATURE GRANULOCYTES %: 0 % (ref 0–5)
LYMPHOCYTES ABSOLUTE: 1.8 E9/L (ref 1.5–4)
LYMPHOCYTES RELATIVE PERCENT: 34.5 % (ref 20–42)
MCH RBC QN AUTO: 28.9 PG (ref 26–35)
MCHC RBC AUTO-ENTMCNC: 33.3 % (ref 32–34.5)
MCV RBC AUTO: 86.8 FL (ref 80–99.9)
MONOCYTES ABSOLUTE: 0.39 E9/L (ref 0.1–0.95)
MONOCYTES RELATIVE PERCENT: 7.5 % (ref 2–12)
NEUTROPHILS ABSOLUTE: 2.81 E9/L (ref 1.8–7.3)
NEUTROPHILS RELATIVE PERCENT: 54 % (ref 43–80)
PDW BLD-RTO: 13.6 FL (ref 11.5–15)
PLATELET # BLD: 287 E9/L (ref 130–450)
PMV BLD AUTO: 9.3 FL (ref 7–12)
POTASSIUM REFLEX MAGNESIUM: 3.8 MMOL/L (ref 3.5–5)
RBC # BLD: 4.08 E12/L (ref 3.8–5.8)
SODIUM BLD-SCNC: 136 MMOL/L (ref 132–146)
TOTAL PROTEIN: 6 G/DL (ref 6.4–8.3)
WBC # BLD: 5.2 E9/L (ref 4.5–11.5)

## 2021-05-15 PROCEDURE — 85025 COMPLETE CBC W/AUTO DIFF WBC: CPT

## 2021-05-15 PROCEDURE — 6370000000 HC RX 637 (ALT 250 FOR IP): Performed by: SURGERY

## 2021-05-15 PROCEDURE — 80048 BASIC METABOLIC PNL TOTAL CA: CPT

## 2021-05-15 PROCEDURE — 6360000002 HC RX W HCPCS: Performed by: SURGERY

## 2021-05-15 PROCEDURE — C9113 INJ PANTOPRAZOLE SODIUM, VIA: HCPCS | Performed by: SURGERY

## 2021-05-15 PROCEDURE — 80076 HEPATIC FUNCTION PANEL: CPT

## 2021-05-15 PROCEDURE — 6370000000 HC RX 637 (ALT 250 FOR IP): Performed by: CLINICAL NURSE SPECIALIST

## 2021-05-15 PROCEDURE — 99024 POSTOP FOLLOW-UP VISIT: CPT | Performed by: SURGERY

## 2021-05-15 PROCEDURE — 2580000003 HC RX 258: Performed by: SURGERY

## 2021-05-15 PROCEDURE — G0378 HOSPITAL OBSERVATION PER HR: HCPCS

## 2021-05-15 PROCEDURE — 96376 TX/PRO/DX INJ SAME DRUG ADON: CPT

## 2021-05-15 PROCEDURE — 36415 COLL VENOUS BLD VENIPUNCTURE: CPT

## 2021-05-15 RX ORDER — OXYCODONE HYDROCHLORIDE AND ACETAMINOPHEN 5; 325 MG/1; MG/1
1 TABLET ORAL EVERY 6 HOURS PRN
Qty: 20 TABLET | Refills: 0 | Status: SHIPPED | OUTPATIENT
Start: 2021-05-15 | End: 2021-05-20

## 2021-05-15 RX ADMIN — OXYCODONE HYDROCHLORIDE AND ACETAMINOPHEN 1 TABLET: 5; 325 TABLET ORAL at 15:23

## 2021-05-15 RX ADMIN — OXYCODONE HYDROCHLORIDE AND ACETAMINOPHEN 1 TABLET: 5; 325 TABLET ORAL at 10:07

## 2021-05-15 RX ADMIN — PANTOPRAZOLE SODIUM 40 MG: 40 INJECTION, POWDER, FOR SOLUTION INTRAVENOUS at 10:03

## 2021-05-15 RX ADMIN — SODIUM CHLORIDE, PRESERVATIVE FREE 10 ML: 5 INJECTION INTRAVENOUS at 10:03

## 2021-05-15 RX ADMIN — CEFDINIR 300 MG: 300 CAPSULE ORAL at 10:30

## 2021-05-15 ASSESSMENT — PAIN SCALES - GENERAL
PAINLEVEL_OUTOF10: 7
PAINLEVEL_OUTOF10: 6

## 2021-05-15 NOTE — DISCHARGE SUMMARY
Physician Discharge Summary     Kathryn Ndiaye  [id number]    Admit date: 5/12/2021    Discharge date and time: No discharge date for patient encounter. Admitting Physician: Richy Oconnor MD     Admission Diagnoses: Abdominal pain [R10.9]    Condition at discharge: stable   Dain Saint [facility] Medication Instructions YPL:[id number]    Printed on:05/15/21 1110   Medication Information                      cefdinir (OMNICEF) 300 MG capsule  Take 1 capsule by mouth every 12 hours for 7 days             oxyCODONE-acetaminophen (PERCOCET) 5-325 MG per tablet  Take 1 tablet by mouth every 6 hours as needed for Pain for up to 5 days. Intended supply: 5 days. Take lowest dose possible to manage pain                   Hospital Course: Had uncomplicated lap repair of bladder perforation and uncomplicated post operative course and was discharged tolerating a general diet, having good bowel function, ambulating independently and with adequate analgesia. Discharge Exam: /72   Pulse 68   Temp 97.7 °F (36.5 °C) (Oral)   Resp 16   Ht 5' 6\" (1.676 m)   Wt 150 lb (68 kg)   SpO2 99%   BMI 24.21 kg/m²     General appearance: alert, appears stated age and cooperative  Head: Normocephalic, without obvious abnormality, atraumatic  Neck: no adenopathy, no carotid bruit, no JVD, supple, symmetrical, trachea midline and thyroid not enlarged, symmetric, no tenderness/mass/nodules  Lungs: clear to auscultation bilaterally  Heart: regular rate and rhythm, S1, S2 normal, no murmur, click, rub or gallop  Abdomen: soft, non-tender; bowel sounds normal; no masses,  no organomegaly and incisions clean and dry  Extremities: extremities normal, atraumatic, no cyanosis or edema      Disposition: home    Patient Instructions: Activity: activity as tolerated  Diet: regular diet  Wound Care: keep wound clean and dry    Follow-up with urology in 2 weeks.     Signed:  Everton Diamond  5/15/2021  11:10 AM

## 2021-05-15 NOTE — PROGRESS NOTES
[facility]  [facility]  Progress Note    NAME: Shoshana Tejeda  MR:  [id number]  :   1887  DATE OF SERVICE:05/15/21    This is a face to face encounter with Shoshana Tejeda 81 y.o. male on 05/15/21    CHIEF COMPLAINT     ID following for   Chief Complaint   Patient presents with    Fall    Abdominal Pain     HISTORY OF PRESENT ILLNESS   S/p Diagnostic laparoscopy with robotic assisted had abdominal exploration and washout; primarily repair of bladder rupture by urology  Drain out  Dave yellow clear pain ok        Patient is tolerating medications. No reported adverse drug reactions. REVIEW OF SYSTEMS     As stated above in the chief complaint, otherwise negative. CURRENT MEDICATIONS      cefdinir  300 mg Oral 2 times per day    sodium chloride flush  10 mL Intravenous 2 times per day    pantoprazole  40 mg Intravenous Daily    And    sodium chloride (PF)  10 mL Intravenous Daily    [Held by provider] heparin (porcine)  5,000 Units Subcutaneous 3 times per day     Continuous Infusions:   lactated ringers 75 mL/hr at 21 1415    sodium chloride       PRN Meds:sodium chloride flush, sodium chloride, ondansetron **OR** ondansetron, oxyCODONE-acetaminophen, opium-belladonna    PHYSICAL EXAM     /72   Pulse 68   Temp 97.7 °F (36.5 °C) (Oral)   Resp 16   Ht 5' 6\" (1.676 m)   Wt 150 lb (68 kg)   SpO2 99%   BMI 24.21 kg/m²   Temp  Av.9 °F (36.6 °C)  Min: 97.7 °F (36.5 °C)  Max: 98.1 °F (36.7 °C)  Constitutional:  The patient is awake,   Skin:    Warm and dry. HEENT:    AT/NC  Chest:   No use of accessory muscles to breathe. Symmetrical expansion. Cardiovascular:  S1 and S2 are rhythmic and regular. Abdomen:   Positive bowel sounds to auscultation. Min pain drin out  Extremities:     no edema.   CNS    AAxO   Lines: piv  Dave - yellow     DIAGNOSTIC RESULTS   Radiology:    Recent Labs     21  0439 21  0724 05/15/21  [id number]

## 2021-05-15 NOTE — PLAN OF CARE
Problem: Falls - Risk of:  Goal: Will remain free from falls  Description: Will remain free from falls  5/14/2021 2314 by Matthew Encarnacion RN  Outcome: Met This Shift  5/14/2021 2314 by Matthew Encarnacion RN  Outcome: Met This Shift  Goal: Absence of physical injury  Description: Absence of physical injury  5/14/2021 2314 by Matthew Encarnacion RN  Outcome: Met This Shift  5/14/2021 2314 by Matthew Encarnacion RN  Outcome: Met This Shift     Problem: Activity:  Goal: Risk for activity intolerance will decrease  Description: Risk for activity intolerance will decrease  Outcome: Met This Shift     Problem:  Bowel/Gastric:  Goal: Bowel function will improve  Description: Bowel function will improve  Outcome: Ongoing  Goal: Diagnostic test results will improve  Description: Diagnostic test results will improve  Outcome: Met This Shift  Goal: Occurrences of nausea will decrease  Description: Occurrences of nausea will decrease  Outcome: Met This Shift  Goal: Occurrences of vomiting will decrease  Description: Occurrences of vomiting will decrease  Outcome: Met This Shift     Problem: Fluid Volume:  Goal: Maintenance of adequate hydration will improve  Description: Maintenance of adequate hydration will improve  Outcome: Met This Shift     Problem: Health Behavior:  Goal: Ability to state signs and symptoms to report to health care provider will improve  Description: Ability to state signs and symptoms to report to health care provider will improve  Outcome: Met This Shift     Problem: Physical Regulation:  Goal: Ability to maintain clinical measurements within normal limits will improve  Description: Ability to maintain clinical measurements within normal limits will improve  Outcome: Met This Shift

## 2021-05-15 NOTE — PLAN OF CARE
Problem: Falls - Risk of:  Goal: Will remain free from falls  5/15/2021 1028 by Louis Patterson RN  Outcome: Met This Shift  Note: No falls     Problem: Falls - Risk of:  Goal: Absence of physical injury  5/15/2021 1028 by Louis Patterson RN  Outcome: Met This Shift     Problem: Activity:  Goal: Risk for activity intolerance will decrease  5/15/2021 1028 by Louis Patterson RN  Outcome: Met This Shift  Note: Pain is less than 3 after being medicated     Problem: Bowel/Gastric:  Goal: Bowel function will improve  5/15/2021 1028 by Louis Patterson RN  Outcome: Met This Shift     Problem: Bowel/Gastric:  Goal: Bowel function will improve  5/15/2021 1028 by Louis Patterson RN  Outcome: Met This Shift     Problem: Bowel/Gastric:  Goal: Diagnostic test results will improve  5/15/2021 1028 by Louis Patterson RN  Outcome: Met This Shift     Problem: Bowel/Gastric:  Goal: Occurrences of nausea will decrease  5/15/2021 1028 by Louis Patterson RN  Outcome: Met This Shift     Problem:  Bowel/Gastric:  Goal: Occurrences of vomiting will decrease  5/15/2021 1028 by Louis Patterson RN  Outcome: Met This Shift     Problem: Fluid Volume:  Goal: Maintenance of adequate hydration will improve  5/15/2021 1028 by Louis Patterson RN  Outcome: Met This Shift     Problem: Health Behavior:  Goal: Ability to state signs and symptoms to report to health care provider will improve  5/15/2021 1028 by Louis Patterson RN  Outcome: Met This Shift     Problem: Physical Regulation:  Goal: Complications related to the disease process, condition or treatment will be avoided or minimized  Outcome: Met This Shift     Problem: Physical Regulation:  Goal: Ability to maintain clinical measurements within normal limits will improve  5/15/2021 1028 by Louis Patterson RN  Outcome: Met This Shift     Problem: Physical Regulation:  Goal: Complications related to the disease process, condition or treatment will be avoided or minimized  Outcome: Met This Shift     Problem: Physical Regulation:  Goal: Ability to maintain clinical measurements within normal limits will improve  5/15/2021 1028 by Anthony Frost RN  Outcome: Met This Shift     Problem: Sensory:  Goal: Ability to identify factors that increase the pain will improve  Outcome: Met This Shift     Problem: Sensory:  Goal: Ability to notify healthcare provider of pain before it becomes unmanageable or unbearable will improve  Outcome: Met This Shift     Problem: Sensory:  Goal: Pain level will decrease  Outcome: Met This Shift

## 2021-05-16 LAB
COMMENT: NORMAL
Lab: NORMAL
REPORT: NORMAL
REPORT: NORMAL
THIS TEST SENT TO: NORMAL

## 2021-05-17 LAB
BLOOD CULTURE, ROUTINE: NORMAL
CULTURE, BLOOD 2: NORMAL

## 2021-05-18 LAB
C. TRACHOMATIS DNA ,URINE: ABNORMAL
N. GONORRHOEAE DNA, URINE: ABNORMAL
SOURCE: ABNORMAL

## 2021-05-19 DIAGNOSIS — G89.18 POST-OP PAIN: Primary | ICD-10-CM

## 2021-05-19 RX ORDER — OXYCODONE HYDROCHLORIDE AND ACETAMINOPHEN 5; 325 MG/1; MG/1
1 TABLET ORAL EVERY 6 HOURS PRN
Qty: 28 TABLET | Refills: 0 | Status: SHIPPED | OUTPATIENT
Start: 2021-05-19 | End: 2021-05-26

## 2021-05-20 LAB — IGE: 472 KU/L

## 2021-05-20 RX ORDER — CEFDINIR 300 MG/1
300 CAPSULE ORAL 2 TIMES DAILY
Qty: 14 CAPSULE | Refills: 0 | Status: SHIPPED | OUTPATIENT
Start: 2021-05-20 | End: 2021-05-27

## (undated) DEVICE — TUBING, SUCTION, 1/4" X 10', STRAIGHT: Brand: MEDLINE

## (undated) DEVICE — COVER,TABLE,44X90,STERILE: Brand: MEDLINE

## (undated) DEVICE — GLOVE ORANGE PI 7 1/2   MSG9075

## (undated) DEVICE — READY WET SKIN SCRUB TRAY-LF: Brand: MEDLINE INDUSTRIES, INC.

## (undated) DEVICE — RETRACTOR BOOKWALTER GENERAL

## (undated) DEVICE — MARKER,SKIN,WI/RULER AND LABELS: Brand: MEDLINE

## (undated) DEVICE — SET MAJOR INSTR HOUSE

## (undated) DEVICE — Device

## (undated) DEVICE — DRAIN SURG 15FR L3 16IN DIA47MM SIL RND HUBLESS FULL FLUT

## (undated) DEVICE — ARM DRAPE

## (undated) DEVICE — GOWN,SIRUS,NONRNF,SETINSLV,XL,20/CS: Brand: MEDLINE

## (undated) DEVICE — ELECTRODE PT RET AD L9FT HI MOIST COND ADH HYDRGEL CORDED

## (undated) DEVICE — COVER,LIGHT HANDLE,FLX,1/PK: Brand: MEDLINE INDUSTRIES, INC.

## (undated) DEVICE — YANKAUER,POOLE TIP,STERILE,50/CS: Brand: MEDLINE

## (undated) DEVICE — GARMENT,MEDLINE,DVT,INT,CALF,MED, GEN2: Brand: MEDLINE

## (undated) DEVICE — MAGNETIC DRAPE: Brand: DEVON

## (undated) DEVICE — INTENDED FOR TISSUE SEPARATION, AND OTHER PROCEDURES THAT REQUIRE A SHARP SURGICAL BLADE TO PUNCTURE OR CUT.: Brand: BARD-PARKER ® STAINLESS STEEL BLADES

## (undated) DEVICE — SUCTION IRRIGATOR: Brand: ENDOWRIST

## (undated) DEVICE — DOUBLE BASIN SET: Brand: MEDLINE INDUSTRIES, INC.

## (undated) DEVICE — BLADE ES L6IN ELASTOMERIC COAT EXT DURABLE BEND UPTO 90DEG

## (undated) DEVICE — NDL CNTR 40CT FM MAG: Brand: MEDLINE INDUSTRIES, INC.

## (undated) DEVICE — COLUMN DRAPE

## (undated) DEVICE — PACK,LAPAROTOMY,NO GOWNS: Brand: MEDLINE

## (undated) DEVICE — CANNULA SEAL

## (undated) DEVICE — GAUZE,SPONGE,4"X4",16PLY,XRAY,STRL,LF: Brand: MEDLINE

## (undated) DEVICE — SOLUTION IRRIG 1500ML 0.9% SOD CHL USP POUR PLAS BTL

## (undated) DEVICE — GOWN,SIRUS,FABRNF,XL,20/CS: Brand: MEDLINE

## (undated) DEVICE — TOWEL,OR,DSP,ST,BLUE,STD,6/PK,12PK/CS: Brand: MEDLINE

## (undated) DEVICE — SPONGE,PEANUT,XRAY,ST,SM,3/8",5/CARD: Brand: MEDLINE INDUSTRIES, INC.

## (undated) DEVICE — SUTURE ABSORBABLE L12IN 0 VIOLET GS22 VLOC 90 VLOCM2116